# Patient Record
Sex: MALE | Race: WHITE | NOT HISPANIC OR LATINO | Employment: FULL TIME | URBAN - METROPOLITAN AREA
[De-identification: names, ages, dates, MRNs, and addresses within clinical notes are randomized per-mention and may not be internally consistent; named-entity substitution may affect disease eponyms.]

---

## 2020-01-15 NOTE — PROGRESS NOTES
"CRS Office Visit History and Physical    Referring MD:   Jt Fulton Md  [address]  [address]  [place] [address]    SUBJECTIVE:     Chief Complaint: Colostomy reversal    History of Present Illness:  The patient is new patient to this practice.   Course is as follows:  Patient is a 49 y.o. male presents for possible reversal of his colostomy.  Initially presented with 3rd episode of diverticulitis and 6x7cm abscess (IR drain placed 10/25/2019).  This drain was later removed, and then (per notes) patient required surgery.  Underwent ex-lap, sigmoid resection, colostomy (11/13/2019; Dr Fulton) for sigmoid diverticulitis.  Final pathology benign.  He states that his recovery from surgery was fairly straightforward.  No ICU stay or organ failure.  No significant wound issues.  Doing well with his colostomy.  He feels that he is back to his baseline after surgery. He is back at work.  Has never had a colonoscopy.  No other prior abdominal surgery. History of prior inguinal hernia repair in 2007.  No family history of colon or rectal cancer.  Denies any liquid of solid or liquid stool prior to his surgery.      Review of patient's allergies indicates:  No Known Allergies    History reviewed. No pertinent past medical history.  Past Surgical History:   Procedure Laterality Date    COLOSTOMY  11/13/2019    HERNIA REPAIR  2007     History reviewed. No pertinent family history.  Social History     Tobacco Use    Smoking status: Not on file   Substance Use Topics    Alcohol use: Not on file    Drug use: Not on file        Review of Systems:  ROS    OBJECTIVE:     Vital Signs (Most Recent)  /80 (BP Location: Left arm, Patient Position: Sitting, BP Method: Large (Automatic))   Pulse 83   Ht 5' 11.5" (1.816 m)   Wt 101.9 kg (224 lb 10.4 oz)   BMI 30.90 kg/m²     Physical Exam:  General: Unknown male in no distress   Neuro: Alert and oriented x 4.  Moves all extremities.     HEENT: No icterus.  " Trachea midline  Respiratory: Respirations are even and unlabored  Cardiac: Regular rate  Abdomen:  Soft, well-healed midline incision, colostomy in place in left lower quadrant, pink and healthy  Extremities: Warm dry and intact  Skin: No rashes      ASSESSMENT/PLAN:     49-year-old male status post Chelsea procedure on November 13th for sigmoid diverticulitis, currently doing well.    He presents for evaluation for colostomy reversal.  We discussed that this procedure may be performed laparoscopically or open, depending on the density of his adhesions.  I told him that the earliest I would consider reversal is 4 months after surgery. Will need colonoscopy and CT of the abdomen and pelvis prior to surgery. He would prefer to plan the procedure in early April because of his work schedule.  We did briefly discuss risks of surgery including bleeding, anastomotic leak, abscess, wound infection, damage to surrounding structures, need for temporary ileostomy, need to convert to open procedure. He will need to return to clinic in early or mid March for preoperative paperwork and labs.      Katie Spear MD  Staff Surgeon  Colon & Rectal Surgery

## 2020-01-20 ENCOUNTER — OFFICE VISIT (OUTPATIENT)
Dept: SURGERY | Facility: CLINIC | Age: 50
End: 2020-01-20
Attending: COLON & RECTAL SURGERY
Payer: COMMERCIAL

## 2020-01-20 ENCOUNTER — TELEPHONE (OUTPATIENT)
Dept: ENDOSCOPY | Facility: HOSPITAL | Age: 50
End: 2020-01-20

## 2020-01-20 VITALS
DIASTOLIC BLOOD PRESSURE: 80 MMHG | HEIGHT: 72 IN | BODY MASS INDEX: 30.42 KG/M2 | SYSTOLIC BLOOD PRESSURE: 131 MMHG | WEIGHT: 224.63 LBS | HEART RATE: 83 BPM

## 2020-01-20 DIAGNOSIS — Z12.11 SPECIAL SCREENING FOR MALIGNANT NEOPLASMS, COLON: Primary | ICD-10-CM

## 2020-01-20 DIAGNOSIS — K57.92 DIVERTICULITIS: Primary | ICD-10-CM

## 2020-01-20 DIAGNOSIS — D49.0 COLORECTAL NEOPLASM: ICD-10-CM

## 2020-01-20 DIAGNOSIS — Z93.3 COLOSTOMY IN PLACE: ICD-10-CM

## 2020-01-20 PROCEDURE — 3008F BODY MASS INDEX DOCD: CPT | Mod: CPTII,S$GLB,, | Performed by: COLON & RECTAL SURGERY

## 2020-01-20 PROCEDURE — 99203 PR OFFICE/OUTPT VISIT, NEW, LEVL III, 30-44 MIN: ICD-10-PCS | Mod: S$GLB,,, | Performed by: COLON & RECTAL SURGERY

## 2020-01-20 PROCEDURE — 99203 OFFICE O/P NEW LOW 30 MIN: CPT | Mod: S$GLB,,, | Performed by: COLON & RECTAL SURGERY

## 2020-01-20 PROCEDURE — 99999 PR PBB SHADOW E&M-NEW PATIENT-LVL IV: CPT | Mod: PBBFAC,,, | Performed by: COLON & RECTAL SURGERY

## 2020-01-20 PROCEDURE — 99999 PR PBB SHADOW E&M-NEW PATIENT-LVL IV: ICD-10-PCS | Mod: PBBFAC,,, | Performed by: COLON & RECTAL SURGERY

## 2020-01-20 PROCEDURE — 3008F PR BODY MASS INDEX (BMI) DOCUMENTED: ICD-10-PCS | Mod: CPTII,S$GLB,, | Performed by: COLON & RECTAL SURGERY

## 2020-01-20 RX ORDER — GABAPENTIN 400 MG/1
400 CAPSULE ORAL
COMMUNITY
Start: 2020-01-15 | End: 2022-10-11

## 2020-01-20 RX ORDER — POLYETHYLENE GLYCOL 3350, SODIUM SULFATE ANHYDROUS, SODIUM BICARBONATE, SODIUM CHLORIDE, POTASSIUM CHLORIDE 236; 22.74; 6.74; 5.86; 2.97 G/4L; G/4L; G/4L; G/4L; G/4L
4 POWDER, FOR SOLUTION ORAL ONCE
Qty: 4000 ML | Refills: 0 | Status: SHIPPED | OUTPATIENT
Start: 2020-01-20 | End: 2020-01-20

## 2020-01-20 NOTE — LETTER
January 20, 2020      Jt Fulton MD  [address]  [facility] [address]  [place] [address]           [facility]  [address]  [facility] [address]  Phone: [phone]          Patient: Moe Hinojosa   MR Number: [id number]   YOB: 1970   Date of Visit: 1/20/2020       Dear Dr. Jt Fulton:    Thank you for referring Moe Hinojosa to me for evaluation. Attached you will find relevant portions of my assessment and plan of care.    If you have questions, please do not hesitate to call me. I look forward to following Moe Hinojosa along with you.    Sincerely,    Katie Spear MD    Enclosure  CC:  No Recipients    If you would like to receive this communication electronically, please contact [email or web address] or [phone] to request more information on [facility] access.    For providers and/or their staff who would like to refer a patient to [facility], please contact us through our one-stop-shop provider referral line, [facility], at [phone].    If you feel you have received this communication in error or would no longer like to receive these types of communications, please e-mail [email or web address]

## 2020-01-22 ENCOUNTER — TELEPHONE (OUTPATIENT)
Dept: SURGERY | Facility: CLINIC | Age: 50
End: 2020-01-22

## 2020-01-22 NOTE — TELEPHONE ENCOUNTER
----- Message from Kiera Laguna sent at 1/22/2020  1:17 PM CST -----  Contact: Doretha hernandes/Dr Fulton's Office #[phone] fax#[phone]  Calling for progress notes to be sent to referring doctor Dr Fulton. Please send notes to fax to #[phone] [email or web address]

## 2020-03-05 ENCOUNTER — HOSPITAL ENCOUNTER (OUTPATIENT)
Facility: HOSPITAL | Age: 50
Discharge: HOME | End: 2020-03-05
Attending: COLON & RECTAL SURGERY | Admitting: COLON & RECTAL SURGERY
Payer: COMMERCIAL

## 2020-03-05 ENCOUNTER — ANESTHESIA (OUTPATIENT)
Dept: ENDOSCOPY | Facility: HOSPITAL | Age: 50
End: 2020-03-05
Payer: COMMERCIAL

## 2020-03-05 ENCOUNTER — ANESTHESIA EVENT (OUTPATIENT)
Dept: ENDOSCOPY | Facility: HOSPITAL | Age: 50
End: 2020-03-05
Payer: COMMERCIAL

## 2020-03-05 ENCOUNTER — HOSPITAL ENCOUNTER (OUTPATIENT)
Dept: RADIOLOGY | Facility: HOSPITAL | Age: 50
Discharge: HOME | End: 2020-03-05
Attending: COLON & RECTAL SURGERY | Admitting: COLON & RECTAL SURGERY
Payer: COMMERCIAL

## 2020-03-05 ENCOUNTER — TELEPHONE (OUTPATIENT)
Dept: SURGERY | Facility: CLINIC | Age: 50
End: 2020-03-05

## 2020-03-05 VITALS
OXYGEN SATURATION: 98 % | BODY MASS INDEX: 31.22 KG/M2 | TEMPERATURE: 98 F | HEART RATE: 81 BPM | SYSTOLIC BLOOD PRESSURE: 146 MMHG | HEIGHT: 71 IN | RESPIRATION RATE: 16 BRPM | WEIGHT: 223 LBS | DIASTOLIC BLOOD PRESSURE: 72 MMHG

## 2020-03-05 DIAGNOSIS — Z12.11 SCREENING FOR MALIGNANT NEOPLASM OF COLON: Primary | ICD-10-CM

## 2020-03-05 DIAGNOSIS — D49.0 COLORECTAL NEOPLASM: ICD-10-CM

## 2020-03-05 PROCEDURE — 88305 TISSUE EXAM BY PATHOLOGIST: CPT | Mod: 26,,, | Performed by: PATHOLOGY

## 2020-03-05 PROCEDURE — 45380 COLONOSCOPY AND BIOPSY: CPT | Mod: 33,,, | Performed by: COLON & RECTAL SURGERY

## 2020-03-05 PROCEDURE — 74177 CT ABDOMEN PELVIS WITH CONTRAST: ICD-10-PCS | Mod: 26,,, | Performed by: RADIOLOGY

## 2020-03-05 PROCEDURE — 88305 TISSUE EXAM BY PATHOLOGIST: ICD-10-PCS | Mod: 26,,, | Performed by: PATHOLOGY

## 2020-03-05 PROCEDURE — 45380 COLONOSCOPY AND BIOPSY: CPT | Performed by: COLON & RECTAL SURGERY

## 2020-03-05 PROCEDURE — 74177 CT ABD & PELVIS W/CONTRAST: CPT | Mod: 26,,, | Performed by: RADIOLOGY

## 2020-03-05 PROCEDURE — E9220 PRA ENDO ANESTHESIA: HCPCS | Mod: 33,,, | Performed by: NURSE ANESTHETIST, CERTIFIED REGISTERED

## 2020-03-05 PROCEDURE — 63600175 PHARM REV CODE 636 W HCPCS: Performed by: COLON & RECTAL SURGERY

## 2020-03-05 PROCEDURE — 63600175 PHARM REV CODE 636 W HCPCS: Performed by: NURSE ANESTHETIST, CERTIFIED REGISTERED

## 2020-03-05 PROCEDURE — 25500020 PHARM REV CODE 255: Performed by: COLON & RECTAL SURGERY

## 2020-03-05 PROCEDURE — 37000008 HC ANESTHESIA 1ST 15 MINUTES: Performed by: COLON & RECTAL SURGERY

## 2020-03-05 PROCEDURE — 88305 TISSUE EXAM BY PATHOLOGIST: CPT | Performed by: PATHOLOGY

## 2020-03-05 PROCEDURE — 37000009 HC ANESTHESIA EA ADD 15 MINS: Performed by: COLON & RECTAL SURGERY

## 2020-03-05 PROCEDURE — 27201012 HC FORCEPS, HOT/COLD, DISP: Performed by: COLON & RECTAL SURGERY

## 2020-03-05 PROCEDURE — 74177 CT ABD & PELVIS W/CONTRAST: CPT | Mod: TC

## 2020-03-05 PROCEDURE — 45380 PR COLONOSCOPY,BIOPSY: ICD-10-PCS | Mod: 33,,, | Performed by: COLON & RECTAL SURGERY

## 2020-03-05 PROCEDURE — E9220 PRA ENDO ANESTHESIA: ICD-10-PCS | Mod: 33,,, | Performed by: NURSE ANESTHETIST, CERTIFIED REGISTERED

## 2020-03-05 RX ORDER — PROPOFOL 10 MG/ML
VIAL (ML) INTRAVENOUS CONTINUOUS PRN
Status: DISCONTINUED | OUTPATIENT
Start: 2020-03-05 | End: 2020-03-05

## 2020-03-05 RX ORDER — SODIUM CHLORIDE 9 MG/ML
INJECTION, SOLUTION INTRAVENOUS CONTINUOUS
Status: DISCONTINUED | OUTPATIENT
Start: 2020-03-05 | End: 2020-03-05 | Stop reason: HOSPADM

## 2020-03-05 RX ORDER — PROPOFOL 10 MG/ML
VIAL (ML) INTRAVENOUS
Status: DISCONTINUED | OUTPATIENT
Start: 2020-03-05 | End: 2020-03-05

## 2020-03-05 RX ORDER — LIDOCAINE HYDROCHLORIDE 20 MG/ML
INJECTION INTRAVENOUS
Status: DISCONTINUED | OUTPATIENT
Start: 2020-03-05 | End: 2020-03-05

## 2020-03-05 RX ADMIN — LIDOCAINE HYDROCHLORIDE 40 MG: 20 INJECTION, SOLUTION INTRAVENOUS at 07:03

## 2020-03-05 RX ADMIN — SODIUM CHLORIDE: 0.9 INJECTION, SOLUTION INTRAVENOUS at 06:03

## 2020-03-05 RX ADMIN — IOHEXOL 15 ML: 350 INJECTION, SOLUTION INTRAVENOUS at 09:03

## 2020-03-05 RX ADMIN — PROPOFOL 150 MCG/KG/MIN: 10 INJECTION, EMULSION INTRAVENOUS at 07:03

## 2020-03-05 RX ADMIN — PROPOFOL 50 MG: 10 INJECTION, EMULSION INTRAVENOUS at 07:03

## 2020-03-05 RX ADMIN — IOHEXOL 100 ML: 350 INJECTION, SOLUTION INTRAVENOUS at 10:03

## 2020-03-05 NOTE — PLAN OF CARE
Pt verbalized understanding of d/c instructions. Wife at bedside. New ostomy bag placed. IV not removed for CT scan. Spoke with MD.

## 2020-03-05 NOTE — H&P
"COLONOSCOPY HISTORY AND PHYSICAL EXAM    Procedure : Colonoscopy      INDICATIONS: asymptomatic screening exam    Family Hx of CRC: None    Last Colonoscopy:  None  Findings: n/a       Past Medical History:   Diagnosis Date    Diverticulitis 2019     Sedation Problems: NO  History reviewed. No pertinent family history.  Fam Hx of Sedation Problems: NO  Social History     Socioeconomic History    Marital status:      Spouse name: Not on file    Number of children: Not on file    Years of education: Not on file    Highest education level: Not on file   Occupational History    Not on file   Social Needs    Financial resource strain: Not on file    Food insecurity:     Worry: Not on file     Inability: Not on file    Transportation needs:     Medical: Not on file     Non-medical: Not on file   Tobacco Use    Smoking status: Never Smoker    Smokeless tobacco: Never Used   Substance and Sexual Activity    Alcohol use: Not Currently    Drug use: Never    Sexual activity: Not on file   Lifestyle    Physical activity:     Days per week: Not on file     Minutes per session: Not on file    Stress: Not on file   Relationships    Social connections:     Talks on phone: Not on file     Gets together: Not on file     Attends Rest[employer] service: Not on file     Active member of club or organization: Not on file     Attends meetings of clubs or organizations: Not on file     Relationship status: Not on file   Other Topics Concern    Not on file   Social History Narrative    Not on file       Review of Systems - Negative except   Respiratory ROS: no dyspnea  Cardiovascular ROS: no exertional chest pain  Gastrointestinal ROS: NO abdominal discomfort,  NO rectal bleeding  Musculoskeletal ROS: no muscular pain  Neurological ROS: no recent stroke    Physical Exam:  BP (!) 142/80 (BP Location: Left arm, Patient Position: Lying)   Pulse 81   Temp 98.2 °F (36.8 °C) (Temporal)   Resp 16   Ht 5' 11" (1.803 m)   " Wt 101.2 kg (223 lb)   SpO2 95%   BMI 31.10 kg/m²   General: no distress  Head: normocephalic  Mallampati Score   Neck: supple, symmetrical, trachea midline  Lungs:  clear to auscultation bilaterally and normal respiratory effort  Heart: regular rate and rhythm and no murmur  Abdomen: soft, non-tender non-distented; bowel sounds normal; no masses,  no organomegaly  Extremities: no cyanosis or edema, or clubbing    ASA:  II    PLAN  COLONOSCOPY.    SedationPlan :MAC    The details of the procedure, the possible need for biopsy or polypectomy and the potential risks including bleeding, perforation, missed polyps were discussed in detail.

## 2020-03-05 NOTE — TRANSFER OF CARE
"Anesthesia Transfer of Care Note    Patient: Moe Hinojosa    Procedure(s) Performed: Procedure(s) (LRB):  COLONOSCOPY (N/A)    Patient location: GI    Anesthesia Type: general    Transport from OR: Transported from OR on room air with adequate spontaneous ventilation    Post pain: adequate analgesia    Post assessment: no apparent anesthetic complications and tolerated procedure well    Post vital signs: stable    Level of consciousness: awake    Nausea/Vomiting: no nausea/vomiting    Complications: none    Transfer of care protocol was followed      Last vitals:   Visit Vitals  /78 (BP Location: Left arm, Patient Position: Lying)   Pulse 85   Temp 36.5 °C (97.7 °F) (Temporal)   Resp 16   Ht 5' 11" (1.803 m)   Wt 101.2 kg (223 lb)   SpO2 95%   BMI 31.10 kg/m²     "

## 2020-03-05 NOTE — ANESTHESIA PREPROCEDURE EVALUATION
03/05/2020  Moe Hinojosa is a 49 y.o., male.    Patient Active Problem List   Diagnosis    Screening for malignant neoplasm of colon     Past Medical History:   Diagnosis Date    Diverticulitis 2019     Past Surgical History:   Procedure Laterality Date    COLON SURGERY  11/13/2019    resection    COLOSTOMY  11/13/2019    HERNIA REPAIR  2007         Anesthesia Evaluation    I have reviewed the Patient Summary Reports.     I have reviewed the Medications.     Review of Systems  Anesthesia Hx:   Denies Personal Hx of Anesthesia complications.       Physical Exam  General:  Well nourished    Airway/Jaw/Neck:  Airway Findings: Mouth Opening: Normal Tongue: Normal  General Airway Assessment: Adult  Mallampati: II  TM Distance: Normal, at least 6 cm      Dental:  Dental Findings: In tact   Chest/Lungs:  Chest/Lungs Findings: Clear to auscultation, Normal Respiratory Rate     Heart/Vascular:  Heart Findings: Rate: Normal  Rhythm: Regular Rhythm  Sounds: Normal        Mental Status:  Mental Status Findings:  Cooperative, Alert and Oriented         Anesthesia Plan  Type of Anesthesia, risks & benefits discussed:  Anesthesia Type:  general  Patient's Preference:   Intra-op Monitoring Plan: standard ASA monitors  Intra-op Monitoring Plan Comments:   Post Op Pain Control Plan: per primary service following discharge from PACU  Post Op Pain Control Plan Comments:   Induction:   IV  Beta Blocker:  Patient is not currently on a Beta-Blocker (No further documentation required).       Informed Consent: Patient understands risks and agrees with Anesthesia plan.  Questions answered. Anesthesia consent signed with patient.  ASA Score: 2     Day of Surgery Review of History & Physical: I have interviewed and examined the patient. I have reviewed the patient's H&P dated:  There are no significant changes.  H&P update  referred to the provider.         Ready For Surgery From Anesthesia Perspective.

## 2020-03-05 NOTE — DISCHARGE INSTRUCTIONS
Colonoscopy     A camera attached to a flexible tube with a viewing lens is used to take video pictures.     Colonoscopy is a test to view the inside of your lower digestive tract (colon and rectum). Sometimes it can show the last part of the small intestine (ileum). During the test, small pieces of tissue may be removed for testing. This is called a biopsy. Small growths, such as polyps, may also be removed.   Why is colonoscopy done?  The test is done to help look for colon cancer. And it can help find the source of abdominal pain, bleeding, and changes in bowel habits. It may be needed once a year, depending on factors such as your:  · Age  · Health history  · Family health history  · Symptoms  · Results from any prior colonoscopy  Risks and possible complications  These include:  · Bleeding               · A puncture or tear in the colon   · Risks of anesthesia  · A cancer lesion not being seen  Getting ready   To prepare for the test:  · Talk with your healthcare provider about the risks of the test (see below). Also ask your healthcare provider about alternatives to the test.  · Tell your healthcare provider about any medicines you take. Also tell him or her about any health conditions you may have.  · Make sure your rectum and colon are empty for the test. Follow the diet and bowel prep instructions exactly. If you dont, the test may need to be rescheduled.  · Plan for a friend or family member to drive you home after the test.     Colonoscopy provides an inside view of the entire colon.     You may discuss the results with your doctor right away or at a future visit.  During the test   The test is usually done in the hospital on an outpatient basis. This means you go home the same day. The procedure takes about 30 minutes. During that time:  · You are given relaxing (sedating) medicine through an IV line. You may be drowsy, or fully asleep.  · The healthcare provider will first give you a physical exam to  check for anal and rectal problems.  · Then the anus is lubricated and the scope inserted.  · If you are awake, you may have a feeling similar to needing to have a bowel movement. You may also feel pressure as air is pumped into the colon. Its OK to pass gas during the procedure.  · Biopsy, polyp removal, or other treatments may be done during the test.  After the test   You may have gas right after the test. It can help to try to pass it to help prevent later bloating. Your healthcare provider may discuss the results with you right away. Or you may need to schedule a follow-up visit to talk about the results. After the test, you can go back to your normal eating and other activities. You may be tired from the sedation and need to rest for a few hours.  Date Last Reviewed: 11/1/2016 © 2000-2017 [employer]. [address] [place] [address], [place] [address]. All rights reserved. This information is not intended as a substitute for professional medical care. Always follow your healthcare professional's instructions.

## 2020-03-05 NOTE — ANESTHESIA POSTPROCEDURE EVALUATION
Anesthesia Post Evaluation    Patient: Moe Hinojosa    Procedure(s) Performed: Procedure(s) (LRB):  COLONOSCOPY (N/A)    Final Anesthesia Type: general    Patient location during evaluation: GI PACU  Patient participation: Yes- Able to Participate  Level of consciousness: awake and alert  Post-procedure vital signs: reviewed and stable  Pain management: adequate  Airway patency: patent    PONV status at discharge: No PONV  Anesthetic complications: no      Cardiovascular status: blood pressure returned to baseline and stable  Respiratory status: unassisted, spontaneous ventilation and room air  Hydration status: euvolemic  Follow-up not needed.          Vitals Value Taken Time   /72 3/5/2020  8:05 AM   Temp 36.5 °C (97.7 °F) 3/5/2020  7:32 AM   Pulse 81 3/5/2020  8:05 AM   Resp 16 3/5/2020  8:05 AM   SpO2 98 % 3/5/2020  8:05 AM         No case tracking events are documented in the log.      Pain/Ana Score: Ana Score: 10 (3/5/2020  7:38 AM)

## 2020-03-05 NOTE — PROVATION PATIENT INSTRUCTIONS
Discharge Summary/Instructions after an Endoscopic Procedure  Patient Name: Moe Hinojosa  Patient MRN: [id number]  Patient YOB: 1970  Thursday, March 05, 2020  Katie Spear MD  RESTRICTIONS:  During your procedure today, you received medications for sedation.  These   medications may affect your judgment, balance and coordination.  Therefore,   for 24 hours, you have the following restrictions:   - DO NOT drive a car, operate machinery, make legal/financial decisions,   sign important papers or drink alcohol.    ACTIVITY:  Today: no heavy lifting, straining or running due to procedural   sedation/anesthesia.  The following day: return to full activity including work.  DIET:  Eat and drink normally unless instructed otherwise.     TREATMENT FOR COMMON SIDE EFFECTS:  - Mild abdominal pain, nausea, belching, bloating or excessive gas:  rest,   eat lightly and use a heating pad.  - Sore Throat: treat with throat lozenges and/or gargle with warm salt   water.  - Because air was used during the procedure, expelling large amounts of air   from your rectum or belching is normal.  - If a bowel prep was taken, you may not have a bowel movement for 1-3 days.    This is normal.  SYMPTOMS TO WATCH FOR AND REPORT TO YOUR PHYSICIAN:  1. Abdominal pain or bloating, other than gas cramps.  2. Chest pain.  3. Back pain.  4. Signs of infection such as: chills or fever occurring within 24 hours   after the procedure.  5. Rectal bleeding, which would show as bright red, maroon, or black stools.   (A tablespoon of blood from the rectum is not serious, especially if   hemorrhoids are present.)  6. Vomiting.  7. Weakness or dizziness.  GO DIRECTLY TO THE NEAREST EMERGENCY ROOM IF YOU HAVE ANY OF THE FOLLOWING:      Difficulty breathing              Chills and/or fever over 101 F   Persistent vomiting and/or vomiting blood   Severe abdominal pain   Severe chest pain   Black, tarry stools   Bleeding- more than one  tablespoon   Any other symptom or condition that you feel may need urgent attention  Your doctor recommends these additional instructions:  If any biopsies were taken, your doctors clinic will contact you in 1 to 2   weeks with any results.  - Discharge patient to home.   - Resume previous diet.   - Continue present medications.   - Await pathology results.   - Repeat colonoscopy date to be determined after pending pathology results   are reviewed for surveillance.   - Return to my office as previously scheduled.   - Written discharge instructions were provided to the patient.   - The signs and symptoms of potential delayed complications were discussed   with the patient.   - Patient has a contact number available for emergencies.   - Return to normal activities tomorrow.  For questions, problems or results please call your physician - Katie Spear MD at Work:  [phone].  [facility], EMERGENCY ROOM PHONE NUMBER: [phone]  IF A COMPLICATION OR EMERGENCY SITUATION ARISES AND YOU ARE UNABLE TO REACH   YOUR PHYSICIAN - GO DIRECTLY TO THE EMERGENCY ROOM.  Katie Spear MD  3/5/2020 7:29:59 AM  This report has been verified and signed electronically.  [employer]

## 2020-03-12 ENCOUNTER — TELEPHONE (OUTPATIENT)
Dept: ENDOSCOPY | Facility: HOSPITAL | Age: 50
End: 2020-03-12

## 2020-03-18 LAB — FINAL PATHOLOGIC DIAGNOSIS: NORMAL

## 2020-03-23 ENCOUNTER — TELEPHONE (OUTPATIENT)
Dept: SURGERY | Facility: CLINIC | Age: 50
End: 2020-03-23

## 2020-03-23 NOTE — TELEPHONE ENCOUNTER
----- Message from Paz Simon sent at 3/23/2020  2:39 PM CDT -----  Good Afternoon,    Patient is schedule for surgery on 4/2/20. Patient is requesting a call back regarding if he will still be able to complete surgery.    Please contact patient @ [phone].    Thank you,  Paz Burnett

## 2020-03-25 ENCOUNTER — TELEPHONE (OUTPATIENT)
Dept: SURGERY | Facility: CLINIC | Age: 50
End: 2020-03-25

## 2020-03-25 NOTE — TELEPHONE ENCOUNTER
----- Message from Katie Spear MD sent at 3/25/2020  2:49 PM CDT -----  Contact: pt   How many days has it been going on?  Does he have severe abdominal pain?    I would have him back off to just liquids for 1-2 days, try a dose of Milk of Magnesia.  If not better in 1-2 days he prob needs to go to the ED.    ----- Message -----  From: Paget Bazile, MA  Sent: 3/25/2020  11:50 AM CDT  To: Katie Spear MD    Pt states he is not running fever and no one else in the house is sick.  He cant remember what he has for nausea but it doesn't work and he throws it up.  Please advise.  ----- Message -----  From: Mercy Duque  Sent: 3/25/2020  10:06 AM CDT  To: Rainer Wilson Staff    Experiencing Constipation, vomiting and nauseous. Please call

## 2020-03-25 NOTE — TELEPHONE ENCOUNTER
Called pt and he is complaining of stomach being tight and vomiting.  He states he has no fever.  Pt said he has an oral anti nausea but continues to throw it up.  He can not keep anything down.  States that his wife is not sick.  Will send to Dr. Spear

## 2020-05-01 ENCOUNTER — TELEPHONE (OUTPATIENT)
Dept: SURGERY | Facility: CLINIC | Age: 50
End: 2020-05-01

## 2020-05-01 NOTE — TELEPHONE ENCOUNTER
----- Message from Domi Mcclellan sent at 5/1/2020  1:26 PM CDT -----  Contact: self  Type:  Patient Returning Call    Who Called:  Patient returning call to nurse to reschedule surgery  Who Left Message for Patient:  Does the patient know what this is regarding?  Would the patient rather a call back or a response via [facility]?call  Best Call Back Number:[phone]  Additional Information:

## 2020-05-01 NOTE — TELEPHONE ENCOUNTER
Pt received a text to call and schedule any test or surgery's that were scheduled before Covid-19. Told him I will get with Dr Spear and call him back about his surgery.

## 2020-06-02 DIAGNOSIS — K57.92 DIVERTICULITIS: Primary | ICD-10-CM

## 2020-06-04 DIAGNOSIS — Z01.818 PRE-OP TESTING: ICD-10-CM

## 2020-06-10 ENCOUNTER — OFFICE VISIT (OUTPATIENT)
Dept: SURGERY | Facility: OTHER | Age: 50
End: 2020-06-10
Attending: COLON & RECTAL SURGERY
Payer: COMMERCIAL

## 2020-06-10 VITALS
DIASTOLIC BLOOD PRESSURE: 68 MMHG | SYSTOLIC BLOOD PRESSURE: 112 MMHG | BODY MASS INDEX: 34.01 KG/M2 | WEIGHT: 242.94 LBS | HEART RATE: 72 BPM | HEIGHT: 71 IN

## 2020-06-10 DIAGNOSIS — Z93.3 COLOSTOMY IN PLACE: Primary | ICD-10-CM

## 2020-06-10 PROCEDURE — 3008F BODY MASS INDEX DOCD: CPT | Mod: CPTII,S$GLB,, | Performed by: COLON & RECTAL SURGERY

## 2020-06-10 PROCEDURE — 99999 PR PBB SHADOW E&M-EST. PATIENT-LVL III: CPT | Mod: PBBFAC,,, | Performed by: COLON & RECTAL SURGERY

## 2020-06-10 PROCEDURE — 3008F PR BODY MASS INDEX (BMI) DOCUMENTED: ICD-10-PCS | Mod: CPTII,S$GLB,, | Performed by: COLON & RECTAL SURGERY

## 2020-06-10 PROCEDURE — 99999 PR PBB SHADOW E&M-EST. PATIENT-LVL III: ICD-10-PCS | Mod: PBBFAC,,, | Performed by: COLON & RECTAL SURGERY

## 2020-06-10 PROCEDURE — 99213 OFFICE O/P EST LOW 20 MIN: CPT | Mod: S$GLB,,, | Performed by: COLON & RECTAL SURGERY

## 2020-06-10 PROCEDURE — 99213 PR OFFICE/OUTPT VISIT, EST, LEVL III, 20-29 MIN: ICD-10-PCS | Mod: S$GLB,,, | Performed by: COLON & RECTAL SURGERY

## 2020-06-10 RX ORDER — POLYETHYLENE GLYCOL 3350 17 G/17G
POWDER, FOR SOLUTION ORAL
Qty: 238 G | Refills: 0 | Status: ON HOLD | OUTPATIENT
Start: 2020-06-10 | End: 2020-06-25 | Stop reason: HOSPADM

## 2020-06-10 RX ORDER — NEOMYCIN SULFATE 500 MG/1
500 TABLET ORAL SEE ADMIN INSTRUCTIONS
Qty: 6 TABLET | Refills: 0 | Status: ON HOLD | OUTPATIENT
Start: 2020-06-10 | End: 2020-06-25 | Stop reason: HOSPADM

## 2020-06-10 RX ORDER — METRONIDAZOLE 500 MG/1
500 TABLET ORAL SEE ADMIN INSTRUCTIONS
Qty: 3 TABLET | Refills: 0 | Status: ON HOLD | OUTPATIENT
Start: 2020-06-10 | End: 2020-06-25 | Stop reason: HOSPADM

## 2020-06-10 NOTE — H&P (VIEW-ONLY)
"CRS Office Visit History and Physical      SUBJECTIVE:     Chief Complaint: Colostomy reversal    History of Present Illness:  Patient is a 50 y.o. male presents for possible reversal of his colostomy.  Initially presented with 3rd episode of diverticulitis and 6x7cm abscess (IR drain placed 10/25/2019).  This drain was later removed, and then (per notes) patient required surgery.  Underwent ex-lap, sigmoid resection, colostomy (11/13/2019; Dr Fulton) for sigmoid diverticulitis.  Final pathology benign.  He states that his recovery from surgery was fairly straightforward.  No ICU stay or organ failure.  No significant wound issues.  Doing well with his colostomy.  He feels that he is back to his baseline after surgery. He is back at work.  No other prior abdominal surgery. History of prior inguinal hernia repair in 2007.  No family history of colon or rectal cancer.  Denies any liquid of solid or liquid stool prior to his surgery.  Colonoscopy with me in March showed small adenomatous polyp of the transverse colon.  CT scan March 5th with no residual inflammation or abscess.      Review of patient's allergies indicates:  No Known Allergies    Past Medical History:   Diagnosis Date    Diverticulitis 2019     Past Surgical History:   Procedure Laterality Date    COLON SURGERY  11/13/2019    resection    COLONOSCOPY N/A 3/5/2020    Procedure: COLONOSCOPY;  Surgeon: Katie Spear MD;  Location: [facility] ([address] [place] [address]);  Service: Endoscopy;  Laterality: N/A;    COLOSTOMY  11/13/2019    HERNIA REPAIR  2007     History reviewed. No pertinent family history.  Social History     Tobacco Use    Smoking status: Never Smoker    Smokeless tobacco: Never Used   Substance Use Topics    Alcohol use: Not Currently    Drug use: Never        Review of Systems:  ROS    OBJECTIVE:     Vital Signs (Most Recent)  /68 (BP Location: Left arm, Patient Position: Sitting, BP Method: Large (Manual))   Pulse 72   Ht 5' 11" " (1.803 m)   Wt 110.2 kg (242 lb 15.2 oz)   BMI 33.88 kg/m²     Physical Exam:  General: Unknown male in no distress   Neuro: Alert and oriented x 4.  Moves all extremities.     HEENT: No icterus.  Trachea midline  Respiratory: Respirations are even and unlabored  Cardiac: Regular rate  Abdomen:  Soft, well-healed midline incision, colostomy in place in left lower quadrant, pink and healthy  Extremities: Warm dry and intact  Skin: No rashes    CT abd/pel (3/5/2020)  Postoperative change status post sigmoid resection with colostomy in place.  No evidence of residual abscess.    I have personally reviewed his CT images.      ASSESSMENT/PLAN:     49-year-old male status post Chelsea procedure on November 13th for sigmoid diverticulitis, ready for ostomy reversal.    I have recommended a laparoscopic, possible open, colostomy reversal.  We discussed risks of surgery including bleeding, anastomotic leak, surgical site infection, damage to ureter or bowel, need for reoperation, hernia, blood clot.  We also discussed anticipated postoperative recovery.  We reviewed the procedure using visual diagrams.  Consents were signed today in clinic.  Asked if any additional questions, none at this time.      Katie Spear MD  Staff Surgeon  Colon & Rectal Surgery

## 2020-06-10 NOTE — PROGRESS NOTES
"CRS Office Visit History and Physical      SUBJECTIVE:     Chief Complaint: Colostomy reversal    History of Present Illness:  Patient is a 50 y.o. male presents for possible reversal of his colostomy.  Initially presented with 3rd episode of diverticulitis and 6x7cm abscess (IR drain placed 10/25/2019).  This drain was later removed, and then (per notes) patient required surgery.  Underwent ex-lap, sigmoid resection, colostomy (11/13/2019; Dr Fulton) for sigmoid diverticulitis.  Final pathology benign.  He states that his recovery from surgery was fairly straightforward.  No ICU stay or organ failure.  No significant wound issues.  Doing well with his colostomy.  He feels that he is back to his baseline after surgery. He is back at work.  No other prior abdominal surgery. History of prior inguinal hernia repair in 2007.  No family history of colon or rectal cancer.  Denies any liquid of solid or liquid stool prior to his surgery.  Colonoscopy with me in March showed small adenomatous polyp of the transverse colon.  CT scan March 5th with no residual inflammation or abscess.      Review of patient's allergies indicates:  No Known Allergies    Past Medical History:   Diagnosis Date    Diverticulitis 2019     Past Surgical History:   Procedure Laterality Date    COLON SURGERY  11/13/2019    resection    COLONOSCOPY N/A 3/5/2020    Procedure: COLONOSCOPY;  Surgeon: Katie Spear MD;  Location: [facility] ([address] [place] [address]);  Service: Endoscopy;  Laterality: N/A;    COLOSTOMY  11/13/2019    HERNIA REPAIR  2007     History reviewed. No pertinent family history.  Social History     Tobacco Use    Smoking status: Never Smoker    Smokeless tobacco: Never Used   Substance Use Topics    Alcohol use: Not Currently    Drug use: Never        Review of Systems:  ROS    OBJECTIVE:     Vital Signs (Most Recent)  /68 (BP Location: Left arm, Patient Position: Sitting, BP Method: Large (Manual))   Pulse 72   Ht 5' 11" " (1.803 m)   Wt 110.2 kg (242 lb 15.2 oz)   BMI 33.88 kg/m²     Physical Exam:  General: Unknown male in no distress   Neuro: Alert and oriented x 4.  Moves all extremities.     HEENT: No icterus.  Trachea midline  Respiratory: Respirations are even and unlabored  Cardiac: Regular rate  Abdomen:  Soft, well-healed midline incision, colostomy in place in left lower quadrant, pink and healthy  Extremities: Warm dry and intact  Skin: No rashes    CT abd/pel (3/5/2020)  Postoperative change status post sigmoid resection with colostomy in place.  No evidence of residual abscess.    I have personally reviewed his CT images.      ASSESSMENT/PLAN:     49-year-old male status post Chelsea procedure on November 13th for sigmoid diverticulitis, ready for ostomy reversal.    I have recommended a laparoscopic, possible open, colostomy reversal.  We discussed risks of surgery including bleeding, anastomotic leak, surgical site infection, damage to ureter or bowel, need for reoperation, hernia, blood clot.  We also discussed anticipated postoperative recovery.  We reviewed the procedure using visual diagrams.  Consents were signed today in clinic.  Asked if any additional questions, none at this time.      Katie Spear MD  Staff Surgeon  Colon & Rectal Surgery

## 2020-06-11 ENCOUNTER — TELEPHONE (OUTPATIENT)
Dept: SURGERY | Facility: CLINIC | Age: 50
End: 2020-06-11

## 2020-06-11 NOTE — TELEPHONE ENCOUNTER
Spoke with [facility] and informed them that the patient lives greater than 50 miles out.  Patient to have Covid testing the morning of surgery and will need to be there 2 1/2 early.

## 2020-06-22 ENCOUNTER — TELEPHONE (OUTPATIENT)
Dept: SURGERY | Facility: CLINIC | Age: 50
End: 2020-06-22

## 2020-06-23 ENCOUNTER — ANESTHESIA (OUTPATIENT)
Dept: SURGERY | Facility: HOSPITAL | Age: 50
DRG: 330 | End: 2020-06-23
Payer: COMMERCIAL

## 2020-06-23 ENCOUNTER — HOSPITAL ENCOUNTER (INPATIENT)
Facility: HOSPITAL | Age: 50
LOS: 2 days | Discharge: HOME | DRG: 330 | End: 2020-06-25
Attending: COLON & RECTAL SURGERY | Admitting: COLON & RECTAL SURGERY
Payer: COMMERCIAL

## 2020-06-23 ENCOUNTER — ANESTHESIA EVENT (OUTPATIENT)
Dept: SURGERY | Facility: HOSPITAL | Age: 50
DRG: 330 | End: 2020-06-23
Payer: COMMERCIAL

## 2020-06-23 DIAGNOSIS — Z93.3 COLOSTOMY IN PLACE: Primary | ICD-10-CM

## 2020-06-23 LAB
ABO + RH BLD: NORMAL
BLD GP AB SCN CELLS X3 SERPL QL: NORMAL
SARS-COV-2 RDRP RESP QL NAA+PROBE: NEGATIVE

## 2020-06-23 PROCEDURE — 63600175 PHARM REV CODE 636 W HCPCS: Performed by: NURSE PRACTITIONER

## 2020-06-23 PROCEDURE — 20600001 HC STEP DOWN PRIVATE ROOM

## 2020-06-23 PROCEDURE — 86901 BLOOD TYPING SEROLOGIC RH(D): CPT

## 2020-06-23 PROCEDURE — 88304 TISSUE EXAM BY PATHOLOGIST: CPT | Mod: 26,,, | Performed by: PATHOLOGY

## 2020-06-23 PROCEDURE — 25000003 PHARM REV CODE 250: Performed by: NURSE ANESTHETIST, CERTIFIED REGISTERED

## 2020-06-23 PROCEDURE — 63600175 PHARM REV CODE 636 W HCPCS: Performed by: NURSE ANESTHETIST, CERTIFIED REGISTERED

## 2020-06-23 PROCEDURE — D9220A PRA ANESTHESIA: ICD-10-PCS | Mod: ANES,,, | Performed by: ANESTHESIOLOGY

## 2020-06-23 PROCEDURE — S0030 INJECTION, METRONIDAZOLE: HCPCS | Performed by: NURSE PRACTITIONER

## 2020-06-23 PROCEDURE — 88307 PR  SURG PATH,LEVEL V: ICD-10-PCS | Mod: 26,,, | Performed by: PATHOLOGY

## 2020-06-23 PROCEDURE — 44227 PR LAP, SURG CLOSE ENTEROSTOMY RESECT ANAST: ICD-10-PCS | Mod: ,,, | Performed by: COLON & RECTAL SURGERY

## 2020-06-23 PROCEDURE — 71000015 HC POSTOP RECOV 1ST HR: Performed by: COLON & RECTAL SURGERY

## 2020-06-23 PROCEDURE — 94761 N-INVAS EAR/PLS OXIMETRY MLT: CPT

## 2020-06-23 PROCEDURE — 88307 TISSUE EXAM BY PATHOLOGIST: CPT | Mod: 26,,, | Performed by: PATHOLOGY

## 2020-06-23 PROCEDURE — D9220A PRA ANESTHESIA: Mod: ANES,,, | Performed by: ANESTHESIOLOGY

## 2020-06-23 PROCEDURE — 63600175 PHARM REV CODE 636 W HCPCS: Performed by: STUDENT IN AN ORGANIZED HEALTH CARE EDUCATION/TRAINING PROGRAM

## 2020-06-23 PROCEDURE — 37000008 HC ANESTHESIA 1ST 15 MINUTES: Performed by: COLON & RECTAL SURGERY

## 2020-06-23 PROCEDURE — 88307 TISSUE EXAM BY PATHOLOGIST: CPT | Performed by: PATHOLOGY

## 2020-06-23 PROCEDURE — 37000009 HC ANESTHESIA EA ADD 15 MINS: Performed by: COLON & RECTAL SURGERY

## 2020-06-23 PROCEDURE — 88304 PR  SURG PATH,LEVEL III: ICD-10-PCS | Mod: 26,,, | Performed by: PATHOLOGY

## 2020-06-23 PROCEDURE — 44227 LAP CLOSE ENTEROSTOMY: CPT | Mod: ,,, | Performed by: COLON & RECTAL SURGERY

## 2020-06-23 PROCEDURE — 36000710: Performed by: COLON & RECTAL SURGERY

## 2020-06-23 PROCEDURE — D9220A PRA ANESTHESIA: Mod: CRNA,,, | Performed by: NURSE ANESTHETIST, CERTIFIED REGISTERED

## 2020-06-23 PROCEDURE — 71000033 HC RECOVERY, INTIAL HOUR: Performed by: COLON & RECTAL SURGERY

## 2020-06-23 PROCEDURE — 27201423 OPTIME MED/SURG SUP & DEVICES STERILE SUPPLY: Performed by: COLON & RECTAL SURGERY

## 2020-06-23 PROCEDURE — C9290 INJ, BUPIVACAINE LIPOSOME: HCPCS | Performed by: COLON & RECTAL SURGERY

## 2020-06-23 PROCEDURE — U0002 COVID-19 LAB TEST NON-CDC: HCPCS

## 2020-06-23 PROCEDURE — 25000003 PHARM REV CODE 250: Performed by: STUDENT IN AN ORGANIZED HEALTH CARE EDUCATION/TRAINING PROGRAM

## 2020-06-23 PROCEDURE — 36000711: Performed by: COLON & RECTAL SURGERY

## 2020-06-23 PROCEDURE — 94799 UNLISTED PULMONARY SVC/PX: CPT

## 2020-06-23 PROCEDURE — 63600175 PHARM REV CODE 636 W HCPCS: Performed by: COLON & RECTAL SURGERY

## 2020-06-23 PROCEDURE — 71000039 HC RECOVERY, EACH ADD'L HOUR: Performed by: COLON & RECTAL SURGERY

## 2020-06-23 PROCEDURE — D9220A PRA ANESTHESIA: ICD-10-PCS | Mod: CRNA,,, | Performed by: NURSE ANESTHETIST, CERTIFIED REGISTERED

## 2020-06-23 PROCEDURE — 88304 TISSUE EXAM BY PATHOLOGIST: CPT | Performed by: PATHOLOGY

## 2020-06-23 PROCEDURE — 25000003 PHARM REV CODE 250: Performed by: NURSE PRACTITIONER

## 2020-06-23 RX ORDER — LIDOCAINE HYDROCHLORIDE 10 MG/ML
1 INJECTION, SOLUTION EPIDURAL; INFILTRATION; INTRACAUDAL; PERINEURAL
Status: COMPLETED | OUTPATIENT
Start: 2020-06-23 | End: 2020-06-23

## 2020-06-23 RX ORDER — LIDOCAINE HYDROCHLORIDE ANHYDROUS AND DEXTROSE MONOHYDRATE .8; 5 G/100ML; G/100ML
INJECTION, SOLUTION INTRAVENOUS CONTINUOUS PRN
Status: DISCONTINUED | OUTPATIENT
Start: 2020-06-23 | End: 2020-06-23

## 2020-06-23 RX ORDER — ONDANSETRON 2 MG/ML
INJECTION INTRAMUSCULAR; INTRAVENOUS
Status: DISCONTINUED | OUTPATIENT
Start: 2020-06-23 | End: 2020-06-23

## 2020-06-23 RX ORDER — METRONIDAZOLE 500 MG/100ML
500 INJECTION, SOLUTION INTRAVENOUS
Status: COMPLETED | OUTPATIENT
Start: 2020-06-23 | End: 2020-06-23

## 2020-06-23 RX ORDER — ONDANSETRON 2 MG/ML
4 INJECTION INTRAMUSCULAR; INTRAVENOUS EVERY 12 HOURS PRN
Status: DISCONTINUED | OUTPATIENT
Start: 2020-06-23 | End: 2020-06-25 | Stop reason: HOSPADM

## 2020-06-23 RX ORDER — ONDANSETRON 2 MG/ML
4 INJECTION INTRAMUSCULAR; INTRAVENOUS ONCE AS NEEDED
Status: DISCONTINUED | OUTPATIENT
Start: 2020-06-23 | End: 2020-06-23

## 2020-06-23 RX ORDER — DEXAMETHASONE SODIUM PHOSPHATE 4 MG/ML
INJECTION, SOLUTION INTRA-ARTICULAR; INTRALESIONAL; INTRAMUSCULAR; INTRAVENOUS; SOFT TISSUE
Status: DISCONTINUED | OUTPATIENT
Start: 2020-06-23 | End: 2020-06-23

## 2020-06-23 RX ORDER — ACETAMINOPHEN 10 MG/ML
1000 INJECTION, SOLUTION INTRAVENOUS EVERY 8 HOURS
Status: COMPLETED | OUTPATIENT
Start: 2020-06-23 | End: 2020-06-24

## 2020-06-23 RX ORDER — GABAPENTIN 300 MG/1
300 CAPSULE ORAL
Status: COMPLETED | OUTPATIENT
Start: 2020-06-23 | End: 2020-06-23

## 2020-06-23 RX ORDER — MIDAZOLAM HYDROCHLORIDE 1 MG/ML
INJECTION, SOLUTION INTRAMUSCULAR; INTRAVENOUS
Status: DISCONTINUED | OUTPATIENT
Start: 2020-06-23 | End: 2020-06-23

## 2020-06-23 RX ORDER — SODIUM CHLORIDE 9 MG/ML
INJECTION, SOLUTION INTRAVENOUS
Status: COMPLETED | OUTPATIENT
Start: 2020-06-23 | End: 2020-06-23

## 2020-06-23 RX ORDER — LABETALOL HYDROCHLORIDE 5 MG/ML
INJECTION, SOLUTION INTRAVENOUS
Status: DISCONTINUED | OUTPATIENT
Start: 2020-06-23 | End: 2020-06-23

## 2020-06-23 RX ORDER — IBUPROFEN 400 MG/1
800 TABLET ORAL EVERY 8 HOURS
Status: DISCONTINUED | OUTPATIENT
Start: 2020-06-24 | End: 2020-06-25 | Stop reason: HOSPADM

## 2020-06-23 RX ORDER — LIDOCAINE HYDROCHLORIDE 20 MG/ML
INJECTION INTRAVENOUS
Status: DISCONTINUED | OUTPATIENT
Start: 2020-06-23 | End: 2020-06-23

## 2020-06-23 RX ORDER — PROPOFOL 10 MG/ML
VIAL (ML) INTRAVENOUS
Status: DISCONTINUED | OUTPATIENT
Start: 2020-06-23 | End: 2020-06-23

## 2020-06-23 RX ORDER — ACETAMINOPHEN 650 MG/20.3ML
975 LIQUID ORAL
Status: COMPLETED | OUTPATIENT
Start: 2020-06-23 | End: 2020-06-23

## 2020-06-23 RX ORDER — ALVIMOPAN 12 MG/1
12 CAPSULE ORAL ONCE
Status: COMPLETED | OUTPATIENT
Start: 2020-06-23 | End: 2020-06-23

## 2020-06-23 RX ORDER — GABAPENTIN 300 MG/1
300 CAPSULE ORAL 3 TIMES DAILY
Status: DISCONTINUED | OUTPATIENT
Start: 2020-06-23 | End: 2020-06-23

## 2020-06-23 RX ORDER — MUPIROCIN 20 MG/G
OINTMENT TOPICAL 2 TIMES DAILY
Status: DISCONTINUED | OUTPATIENT
Start: 2020-06-23 | End: 2020-06-25 | Stop reason: HOSPADM

## 2020-06-23 RX ORDER — ALVIMOPAN 12 MG/1
12 CAPSULE ORAL 2 TIMES DAILY
Status: DISCONTINUED | OUTPATIENT
Start: 2020-06-23 | End: 2020-06-25 | Stop reason: HOSPADM

## 2020-06-23 RX ORDER — ROCURONIUM BROMIDE 10 MG/ML
INJECTION, SOLUTION INTRAVENOUS
Status: DISCONTINUED | OUTPATIENT
Start: 2020-06-23 | End: 2020-06-23

## 2020-06-23 RX ORDER — SODIUM CHLORIDE 0.9 % (FLUSH) 0.9 %
10 SYRINGE (ML) INJECTION
Status: DISCONTINUED | OUTPATIENT
Start: 2020-06-23 | End: 2020-06-25 | Stop reason: HOSPADM

## 2020-06-23 RX ORDER — OXYCODONE HYDROCHLORIDE 10 MG/1
10 TABLET ORAL EVERY 4 HOURS PRN
Status: DISCONTINUED | OUTPATIENT
Start: 2020-06-23 | End: 2020-06-25 | Stop reason: HOSPADM

## 2020-06-23 RX ORDER — ENOXAPARIN SODIUM 100 MG/ML
40 INJECTION SUBCUTANEOUS EVERY 24 HOURS
Status: DISCONTINUED | OUTPATIENT
Start: 2020-06-24 | End: 2020-06-25 | Stop reason: HOSPADM

## 2020-06-23 RX ORDER — MUPIROCIN 20 MG/G
1 OINTMENT TOPICAL
Status: COMPLETED | OUTPATIENT
Start: 2020-06-23 | End: 2020-06-23

## 2020-06-23 RX ORDER — ACETAMINOPHEN 500 MG
1000 TABLET ORAL EVERY 8 HOURS
Status: DISCONTINUED | OUTPATIENT
Start: 2020-06-24 | End: 2020-06-25 | Stop reason: HOSPADM

## 2020-06-23 RX ORDER — TRAMADOL HYDROCHLORIDE 50 MG/1
50 TABLET ORAL EVERY 6 HOURS PRN
Status: DISCONTINUED | OUTPATIENT
Start: 2020-06-23 | End: 2020-06-25 | Stop reason: HOSPADM

## 2020-06-23 RX ORDER — HEPARIN SODIUM 5000 [USP'U]/ML
5000 INJECTION, SOLUTION INTRAVENOUS; SUBCUTANEOUS EVERY 8 HOURS
Status: COMPLETED | OUTPATIENT
Start: 2020-06-23 | End: 2020-06-23

## 2020-06-23 RX ORDER — KETAMINE HCL IN 0.9 % NACL 50 MG/5 ML
SYRINGE (ML) INTRAVENOUS
Status: DISCONTINUED | OUTPATIENT
Start: 2020-06-23 | End: 2020-06-23

## 2020-06-23 RX ORDER — FENTANYL CITRATE 50 UG/ML
INJECTION, SOLUTION INTRAMUSCULAR; INTRAVENOUS
Status: DISCONTINUED | OUTPATIENT
Start: 2020-06-23 | End: 2020-06-23

## 2020-06-23 RX ORDER — FENTANYL CITRATE 50 UG/ML
25 INJECTION, SOLUTION INTRAMUSCULAR; INTRAVENOUS EVERY 5 MIN PRN
Status: DISCONTINUED | OUTPATIENT
Start: 2020-06-23 | End: 2020-06-23

## 2020-06-23 RX ORDER — TRIPROLIDINE/PSEUDOEPHEDRINE 2.5MG-60MG
600 TABLET ORAL
Status: COMPLETED | OUTPATIENT
Start: 2020-06-23 | End: 2020-06-23

## 2020-06-23 RX ORDER — SODIUM CHLORIDE 0.9 % (FLUSH) 0.9 %
10 SYRINGE (ML) INJECTION
Status: DISCONTINUED | OUTPATIENT
Start: 2020-06-23 | End: 2020-06-23

## 2020-06-23 RX ORDER — OXYCODONE HYDROCHLORIDE 5 MG/1
5 TABLET ORAL EVERY 4 HOURS PRN
Status: DISCONTINUED | OUTPATIENT
Start: 2020-06-23 | End: 2020-06-25 | Stop reason: HOSPADM

## 2020-06-23 RX ORDER — SODIUM CHLORIDE 9 MG/ML
INJECTION, SOLUTION INTRAVENOUS CONTINUOUS
Status: DISCONTINUED | OUTPATIENT
Start: 2020-06-23 | End: 2020-06-23

## 2020-06-23 RX ORDER — GABAPENTIN 400 MG/1
400 CAPSULE ORAL 3 TIMES DAILY
Status: DISCONTINUED | OUTPATIENT
Start: 2020-06-23 | End: 2020-06-25 | Stop reason: HOSPADM

## 2020-06-23 RX ADMIN — SODIUM CHLORIDE: 0.9 INJECTION, SOLUTION INTRAVENOUS at 11:06

## 2020-06-23 RX ADMIN — GABAPENTIN 300 MG: 300 CAPSULE ORAL at 11:06

## 2020-06-23 RX ADMIN — OXYCODONE HYDROCHLORIDE 10 MG: 10 TABLET ORAL at 08:06

## 2020-06-23 RX ADMIN — IBUPROFEN 800 MG: 800 INJECTION INTRAVENOUS at 10:06

## 2020-06-23 RX ADMIN — ROCURONIUM BROMIDE 20 MG: 10 INJECTION, SOLUTION INTRAVENOUS at 02:06

## 2020-06-23 RX ADMIN — ROCURONIUM BROMIDE 10 MG: 10 INJECTION, SOLUTION INTRAVENOUS at 03:06

## 2020-06-23 RX ADMIN — LABETALOL HYDROCHLORIDE 5 MG: 5 INJECTION, SOLUTION INTRAVENOUS at 02:06

## 2020-06-23 RX ADMIN — LIDOCAINE HYDROCHLORIDE 10 MG: 10 INJECTION, SOLUTION EPIDURAL; INFILTRATION; INTRACAUDAL; PERINEURAL at 11:06

## 2020-06-23 RX ADMIN — SODIUM CHLORIDE, SODIUM GLUCONATE, SODIUM ACETATE, POTASSIUM CHLORIDE, MAGNESIUM CHLORIDE, SODIUM PHOSPHATE, DIBASIC, AND POTASSIUM PHOSPHATE: .53; .5; .37; .037; .03; .012; .00082 INJECTION, SOLUTION INTRAVENOUS at 02:06

## 2020-06-23 RX ADMIN — MUPIROCIN 1 G: 20 OINTMENT TOPICAL at 11:06

## 2020-06-23 RX ADMIN — MUPIROCIN: 20 OINTMENT TOPICAL at 08:06

## 2020-06-23 RX ADMIN — FENTANYL CITRATE 100 MCG: 50 INJECTION, SOLUTION INTRAMUSCULAR; INTRAVENOUS at 01:06

## 2020-06-23 RX ADMIN — PROPOFOL 170 MG: 10 INJECTION, EMULSION INTRAVENOUS at 01:06

## 2020-06-23 RX ADMIN — ONDANSETRON 4 MG: 2 INJECTION INTRAMUSCULAR; INTRAVENOUS at 08:06

## 2020-06-23 RX ADMIN — CEFTRIAXONE 2 G: 2 INJECTION, SOLUTION INTRAVENOUS at 01:06

## 2020-06-23 RX ADMIN — IBUPROFEN 600 MG: 100 SUSPENSION ORAL at 11:06

## 2020-06-23 RX ADMIN — DEXAMETHASONE SODIUM PHOSPHATE 8 MG: 4 INJECTION, SOLUTION INTRAMUSCULAR; INTRAVENOUS at 01:06

## 2020-06-23 RX ADMIN — ALVIMOPAN 12 MG: 12 CAPSULE ORAL at 11:06

## 2020-06-23 RX ADMIN — ACETAMINOPHEN 976.6 MG: 160 SOLUTION ORAL at 11:06

## 2020-06-23 RX ADMIN — GABAPENTIN 400 MG: 400 CAPSULE ORAL at 08:06

## 2020-06-23 RX ADMIN — Medication 10 MG: at 04:06

## 2020-06-23 RX ADMIN — ROCURONIUM BROMIDE 30 MG: 10 INJECTION, SOLUTION INTRAVENOUS at 01:06

## 2020-06-23 RX ADMIN — Medication 10 MG: at 02:06

## 2020-06-23 RX ADMIN — METRONIDAZOLE 500 MG: 500 SOLUTION INTRAVENOUS at 01:06

## 2020-06-23 RX ADMIN — Medication 10 MG: at 03:06

## 2020-06-23 RX ADMIN — Medication 20 MG: at 01:06

## 2020-06-23 RX ADMIN — ALVIMOPAN 12 MG: 12 CAPSULE ORAL at 08:06

## 2020-06-23 RX ADMIN — ACETAMINOPHEN 1000 MG: 10 INJECTION, SOLUTION INTRAVENOUS at 09:06

## 2020-06-23 RX ADMIN — ONDANSETRON 4 MG: 2 INJECTION, SOLUTION INTRAMUSCULAR; INTRAVENOUS at 04:06

## 2020-06-23 RX ADMIN — SUGAMMADEX 200 MG: 100 INJECTION, SOLUTION INTRAVENOUS at 05:06

## 2020-06-23 RX ADMIN — LIDOCAINE HYDROCHLORIDE 0.02 MG/KG/MIN: 8 INJECTION, SOLUTION INTRAVENOUS at 01:06

## 2020-06-23 RX ADMIN — FENTANYL CITRATE 25 MCG: 50 INJECTION, SOLUTION INTRAMUSCULAR; INTRAVENOUS at 05:06

## 2020-06-23 RX ADMIN — FENTANYL CITRATE 25 MCG: 50 INJECTION, SOLUTION INTRAMUSCULAR; INTRAVENOUS at 04:06

## 2020-06-23 RX ADMIN — ROCURONIUM BROMIDE 10 MG: 10 INJECTION, SOLUTION INTRAVENOUS at 04:06

## 2020-06-23 RX ADMIN — SODIUM CHLORIDE, SODIUM GLUCONATE, SODIUM ACETATE, POTASSIUM CHLORIDE, MAGNESIUM CHLORIDE, SODIUM PHOSPHATE, DIBASIC, AND POTASSIUM PHOSPHATE: .53; .5; .37; .037; .03; .012; .00082 INJECTION, SOLUTION INTRAVENOUS at 03:06

## 2020-06-23 RX ADMIN — MIDAZOLAM HYDROCHLORIDE 2 MG: 1 INJECTION, SOLUTION INTRAMUSCULAR; INTRAVENOUS at 01:06

## 2020-06-23 RX ADMIN — HEPARIN SODIUM 5000 UNITS: 5000 INJECTION INTRAVENOUS; SUBCUTANEOUS at 11:06

## 2020-06-23 RX ADMIN — SODIUM CHLORIDE, SODIUM GLUCONATE, SODIUM ACETATE, POTASSIUM CHLORIDE, MAGNESIUM CHLORIDE, SODIUM PHOSPHATE, DIBASIC, AND POTASSIUM PHOSPHATE: .53; .5; .37; .037; .03; .012; .00082 INJECTION, SOLUTION INTRAVENOUS at 01:06

## 2020-06-23 RX ADMIN — LIDOCAINE HYDROCHLORIDE 100 MG: 20 INJECTION, SOLUTION INTRAVENOUS at 01:06

## 2020-06-23 NOTE — ANESTHESIA PROCEDURE NOTES
Intubation  Performed by: Caty Monique CRNA  Authorized by: Katlyn Griffith MD     Intubation:     Induction:  Intravenous    Intubated:  Postinduction    Mask Ventilation:  Easy mask    Attempts:  1    Attempted By:  CRNA    Method of Intubation:  Direct    Blade:  Jacinto 2    Laryngeal View Grade: Grade I - full view of chords      Difficult Airway Encountered?: No      Complications:  None    Airway Device:  Oral endotracheal tube    Airway Device Size:  7.5    Style/Cuff Inflation:  Cuffed (inflated to minimal occlusive pressure)    Inflation Amount (mL):  6    Tube secured:  22    Secured at:  The lips    Placement Verified By:  Capnometry    Complicating Factors:  None    Findings Post-Intubation:  BS equal bilateral and atraumatic/condition of teeth unchanged

## 2020-06-23 NOTE — TRANSFER OF CARE
"Anesthesia Transfer of Care Note    Patient: Moe Hinojosa    Procedure(s) Performed: Procedure(s) (LRB):  CLOSURE, COLOSTOMY, LAPAROSCOPIC, ERAS low (N/A)    Patient location: PACU    Anesthesia Type: general    Transport from OR: Transported from OR on 6-10 L/min O2 by face mask with adequate spontaneous ventilation    Post pain: adequate analgesia    Post assessment: no apparent anesthetic complications and tolerated procedure well    Post vital signs: stable    Level of consciousness: responds to stimulation and sedated    Nausea/Vomiting: no nausea/vomiting    Complications: none    Transfer of care protocol was followed      Last vitals:   Visit Vitals  /86 (BP Location: Left arm, Patient Position: Lying)   Pulse 88   Temp 36.8 °C (98.2 °F) (Oral)   Resp 16   Ht 5' 11" (1.803 m)   Wt 105.2 kg (232 lb)   SpO2 97%   BMI 32.36 kg/m²     "

## 2020-06-23 NOTE — BRIEF OP NOTE
[facility]  Brief Operative Note    SUMMARY     Surgery Date: 6/23/2020     Surgeon(s) and Role:     * Katie Spear MD - Primary     * Jessica Berrios MD - Resident - Assisting    Pre-op Diagnosis:  Diverticulitis [K57.92]    Post-op Diagnosis:  Post-Op Diagnosis Codes:     * Diverticulitis [K57.92]    Procedure(s) (LRB):  CLOSURE, COLOSTOMY, LAPAROSCOPIC, ERAS low (N/A)    Anesthesia: General    Description of Procedure:     1. Laparoscopic Colostomy takedown.   2. Flexible Sigmodoscopy    Description of the findings of the procedure:     Laparoscopic colostomy takedown. End to end stapled anastomosis. No air leak visualized during flex sig.     Estimated Blood Loss: 100 mL         Specimens:   Specimen (12h ago, onward)    None

## 2020-06-23 NOTE — PLAN OF CARE
Vital signs stable. Afebrile. Drowsy, oriented and following commands. Patient denies pain/nausea. Dressings remain CDI. [facility] in place. IVF d/c'd by MD. POC reviewed with patient and understanding verbalized. Wife updated via text. Awaiting transfer to [facility].

## 2020-06-23 NOTE — INTERVAL H&P NOTE
The patient has been examined and the H&P has been reviewed:    I concur with the findings and no changes have occurred since H&P was written.    Anesthesia/Surgery risks, benefits and alternative options discussed and understood by patient/family.          Active Hospital Problems    Diagnosis  POA    Colostomy in place [Z93.3]  Not Applicable      Resolved Hospital Problems   No resolved problems to display.

## 2020-06-23 NOTE — ANESTHESIA PREPROCEDURE EVALUATION
06/23/2020  Moe Hinojosa is a 50 y.o., male   Pre-operative evaluation for Procedure(s) (LRB):  CLOSURE, COLOSTOMY, LAPAROSCOPIC, ERAS low (N/A)    Moe Hinojosa is a 50 y.o. male presenting for colostomy reversal. He has a history of recurrent diverticulitis with abscess formation, eventually requiring ex-lap and sigmoid colon resection with colostomy in 11/2019. He has recovered well and now the plan is for reversal.      Patient Active Problem List   Diagnosis    Screening for malignant neoplasm of colon    Colostomy in place       Review of patient's allergies indicates:  No Known Allergies    No current facility-administered medications on file prior to encounter.      Current Outpatient Medications on File Prior to Encounter   Medication Sig Dispense Refill    gabapentin (NEURONTIN) 400 MG capsule Take 400 mg by mouth.         Past Surgical History:   Procedure Laterality Date    COLON SURGERY  11/13/2019    resection    COLONOSCOPY N/A 3/5/2020    Procedure: COLONOSCOPY;  Surgeon: Katie Spear MD;  Location: [address]);  Service: Endoscopy;  Laterality: N/A;    COLOSTOMY  11/13/2019    HERNIA REPAIR  2007     Labs pending    Anesthesia Evaluation    I have reviewed the Patient Summary Reports.   I have reviewed the NPO Status.   I have reviewed the Medications.     Review of Systems  Anesthesia Hx:  No problems with previous Anesthesia Denies Hx of Anesthetic complications  History of prior surgery of interest to airway management or planning: Denies Family Hx of Anesthesia complications.   Denies Personal Hx of Anesthesia complications.   Social:  No Alcohol Use, Non-Smoker    Hematology/Oncology:  Hematology Normal   Oncology Normal     EENT/Dental:EENT/Dental Normal   Cardiovascular:  Cardiovascular Normal Exercise tolerance: good     Pulmonary:  Pulmonary Normal     Renal/:  Renal/ Normal     Hepatic/GI:   Recurrent diverticulitis with abscess formation resulting in colon resection   Neurological:  Neurology Normal    Endocrine:  Endocrine Normal    Psych:  Psychiatric Normal           Physical Exam  General:  Well nourished    Airway/Jaw/Neck:  Airway Findings: Mouth Opening: Normal Tongue: Normal  General Airway Assessment: Adult, Average  Mallampati: II  TM Distance: Normal, at least 6 cm  Jaw/Neck Findings:  Neck ROM: Normal ROM      Dental:  Dental Findings: In tact   Chest/Lungs:  Chest/Lungs Findings: Normal Respiratory Rate, Clear to auscultation     Heart/Vascular:  Heart Findings: Rate: Normal  Rhythm: Regular Rhythm        Mental Status:  Mental Status Findings:  Alert and Oriented, Cooperative         Anesthesia Plan  Type of Anesthesia, risks & benefits discussed:  Anesthesia Type:  general  Patient's Preference:   Intra-op Monitoring Plan: standard ASA monitors  Intra-op Monitoring Plan Comments:   Post Op Pain Control Plan: per primary service following discharge from PACU, IV/PO Opioids PRN and multimodal analgesia  Post Op Pain Control Plan Comments:   Induction:   IV  Beta Blocker:  Patient is not currently on a Beta-Blocker (No further documentation required).       Informed Consent: Patient understands risks and agrees with Anesthesia plan.  Questions answered. Anesthesia consent signed with patient.  ASA Score: 2     Day of Surgery Review of History & Physical:    H&P update referred to the surgeon.         Ready For Surgery From Anesthesia Perspective.

## 2020-06-24 PROBLEM — E66.01 SEVERE OBESITY (BMI >= 40): Status: ACTIVE | Noted: 2020-06-24

## 2020-06-24 LAB
ANION GAP SERPL CALC-SCNC: 10 MMOL/L (ref 8–16)
BASOPHILS # BLD AUTO: 0.01 K/UL (ref 0–0.2)
BASOPHILS NFR BLD: 0.1 % (ref 0–1.9)
BUN SERPL-MCNC: 10 MG/DL (ref 6–20)
CALCIUM SERPL-MCNC: 8.5 MG/DL (ref 8.7–10.5)
CHLORIDE SERPL-SCNC: 105 MMOL/L (ref 95–110)
CO2 SERPL-SCNC: 24 MMOL/L (ref 23–29)
CREAT SERPL-MCNC: 1 MG/DL (ref 0.5–1.4)
DIFFERENTIAL METHOD: ABNORMAL
EOSINOPHIL # BLD AUTO: 0 K/UL (ref 0–0.5)
EOSINOPHIL NFR BLD: 0 % (ref 0–8)
ERYTHROCYTE [DISTWIDTH] IN BLOOD BY AUTOMATED COUNT: 12.5 % (ref 11.5–14.5)
EST. GFR  (AFRICAN AMERICAN): >60 ML/MIN/1.73 M^2
EST. GFR  (NON AFRICAN AMERICAN): >60 ML/MIN/1.73 M^2
GLUCOSE SERPL-MCNC: 124 MG/DL (ref 70–110)
HCT VFR BLD AUTO: 46.1 % (ref 40–54)
HGB BLD-MCNC: 14.7 G/DL (ref 14–18)
IMM GRANULOCYTES # BLD AUTO: 0.04 K/UL (ref 0–0.04)
IMM GRANULOCYTES NFR BLD AUTO: 0.3 % (ref 0–0.5)
LYMPHOCYTES # BLD AUTO: 1 K/UL (ref 1–4.8)
LYMPHOCYTES NFR BLD: 8.1 % (ref 18–48)
MAGNESIUM SERPL-MCNC: 2 MG/DL (ref 1.6–2.6)
MCH RBC QN AUTO: 29.9 PG (ref 27–31)
MCHC RBC AUTO-ENTMCNC: 31.9 G/DL (ref 32–36)
MCV RBC AUTO: 94 FL (ref 82–98)
MONOCYTES # BLD AUTO: 0.6 K/UL (ref 0.3–1)
MONOCYTES NFR BLD: 5 % (ref 4–15)
NEUTROPHILS # BLD AUTO: 10.3 K/UL (ref 1.8–7.7)
NEUTROPHILS NFR BLD: 86.5 % (ref 38–73)
NRBC BLD-RTO: 0 /100 WBC
PHOSPHATE SERPL-MCNC: 3.3 MG/DL (ref 2.7–4.5)
PLATELET # BLD AUTO: 205 K/UL (ref 150–350)
PMV BLD AUTO: 11.3 FL (ref 9.2–12.9)
POTASSIUM SERPL-SCNC: 4.6 MMOL/L (ref 3.5–5.1)
RBC # BLD AUTO: 4.91 M/UL (ref 4.6–6.2)
SODIUM SERPL-SCNC: 139 MMOL/L (ref 136–145)
WBC # BLD AUTO: 11.86 K/UL (ref 3.9–12.7)

## 2020-06-24 PROCEDURE — 94761 N-INVAS EAR/PLS OXIMETRY MLT: CPT

## 2020-06-24 PROCEDURE — 63600175 PHARM REV CODE 636 W HCPCS: Performed by: STUDENT IN AN ORGANIZED HEALTH CARE EDUCATION/TRAINING PROGRAM

## 2020-06-24 PROCEDURE — 97116 GAIT TRAINING THERAPY: CPT

## 2020-06-24 PROCEDURE — 85025 COMPLETE CBC W/AUTO DIFF WBC: CPT

## 2020-06-24 PROCEDURE — 25000003 PHARM REV CODE 250: Performed by: STUDENT IN AN ORGANIZED HEALTH CARE EDUCATION/TRAINING PROGRAM

## 2020-06-24 PROCEDURE — 83735 ASSAY OF MAGNESIUM: CPT

## 2020-06-24 PROCEDURE — 84100 ASSAY OF PHOSPHORUS: CPT

## 2020-06-24 PROCEDURE — 94799 UNLISTED PULMONARY SVC/PX: CPT

## 2020-06-24 PROCEDURE — 97161 PT EVAL LOW COMPLEX 20 MIN: CPT

## 2020-06-24 PROCEDURE — 36415 COLL VENOUS BLD VENIPUNCTURE: CPT

## 2020-06-24 PROCEDURE — 97165 OT EVAL LOW COMPLEX 30 MIN: CPT

## 2020-06-24 PROCEDURE — 20600001 HC STEP DOWN PRIVATE ROOM

## 2020-06-24 PROCEDURE — 80048 BASIC METABOLIC PNL TOTAL CA: CPT

## 2020-06-24 PROCEDURE — 99900035 HC TECH TIME PER 15 MIN (STAT)

## 2020-06-24 RX ADMIN — ACETAMINOPHEN 1000 MG: 10 INJECTION, SOLUTION INTRAVENOUS at 01:06

## 2020-06-24 RX ADMIN — ALVIMOPAN 12 MG: 12 CAPSULE ORAL at 09:06

## 2020-06-24 RX ADMIN — ENOXAPARIN SODIUM 40 MG: 40 INJECTION SUBCUTANEOUS at 04:06

## 2020-06-24 RX ADMIN — MUPIROCIN: 20 OINTMENT TOPICAL at 10:06

## 2020-06-24 RX ADMIN — ACETAMINOPHEN 1000 MG: 10 INJECTION, SOLUTION INTRAVENOUS at 05:06

## 2020-06-24 RX ADMIN — IBUPROFEN 800 MG: 400 TABLET, FILM COATED ORAL at 09:06

## 2020-06-24 RX ADMIN — IBUPROFEN 800 MG: 800 INJECTION INTRAVENOUS at 02:06

## 2020-06-24 RX ADMIN — ACETAMINOPHEN 1000 MG: 500 TABLET ORAL at 09:06

## 2020-06-24 RX ADMIN — ALVIMOPAN 12 MG: 12 CAPSULE ORAL at 10:06

## 2020-06-24 RX ADMIN — IBUPROFEN 800 MG: 800 INJECTION INTRAVENOUS at 05:06

## 2020-06-24 NOTE — PT/OT/SLP EVAL
"Physical Therapy Evaluation/Discharge    Patient Name:  Moe Hinojosa   MRN:  [id number]  Admit Date: 6/23/2020  Admitting Diagnosis:  Colostomy in place  Length of Stay: 1 days  Recent Surgery: Procedure(s) (LRB):  CLOSURE, COLOSTOMY, LAPAROSCOPIC, ERAS low (N/A) 1 Day Post-Op    Recommendations:     Discharge Recommendations:  home   Discharge Equipment Recommendations: none   Barriers to discharge: None    Assessment:     Moe Hinojosa is a 50 y.o. male admitted with a medical diagnosis of Colostomy in place. PT evaluation complete and no goals established. Pt is functioning at high level and does not required acute care physical therapy services. Education provided to ambulate in hallway 3x/day with RN in order to maintain strength and endurance. Pt verbalized understanding. Please re-consult if functional mobility changes. Anticipate d/c to home; no needs.       Problem List:    Rehab Prognosis: Good    Plan:     · D/c from acute PT    Subjective   Communicated with RN prior to session.  Patient found HOB elevated upon PT entry to room, agreeable to evaluation. Moe Hinojosa's wife present during session.    Chief Complaint: No chief complaint on file.    Patient/Family Comments/goals: to get better and return home   Pain/Comfort:  · Pain Rating 1: 0/10  · Pain Rating Post-Intervention 1: 0/10    Living Environment:  Patient lives with wife in a H with  to enter.   Prior Level of Function:   Patient reports being indep with mobility & with ADLs. Patient uses DME as follows: none. DME owned (not currently used): none.    Patient reports they will have assistance from wife upon discharge.    Objective:   Patient found with: peripheral IV    General Precautions: Standard, Cardiac fall   Orthopedic Precautions:N/A   Braces: N/A   Oxygen Device: Room Air  Vitals: /77 (Patient Position: Lying)   Pulse 95   Temp 97.7 °F (36.5 °C) (Oral)   Resp 18   Ht 5' 11" (1.803 m)   Wt 105.2 kg (232 lb)   SpO2 " 96%   BMI 32.36 kg/m²     Exams:  · Cognition:   · Alert and Cooperative  · Ox4  · Command following: Follows multistep  commands  · Fluency: clear/fluent    · RLE ROM: WFL  · RLE Strength: WFL  · LLE ROM: WFL  · LLE Strength: WFL    Outcome Measures:  AM-PAC 6 CLICK MOBILITY  Turning over in bed (including adjusting bedclothes, sheets and blankets)?: 4  Sitting down on and standing up from a chair with arms (e.g., wheelchair, bedside commode, etc.): 4  Moving from lying on back to sitting on the side of the bed?: 4  Moving to and from a bed to a chair (including a wheelchair)?: 4  Need to walk in hospital room?: 4  Climbing 3-5 steps with a railing?: 4  Basic Mobility Total Score: 24     Functional Mobility:  Additional staff present: OT  Bed Mobility:  · Supine to Sit: independence; HOB elevated  · Scooting anteriorly to EOB to have both feet planted on floor: independence    Sitting Balance at Edge of Bed:   Assistance Level Required: [place]   Time: 5 minutes    Transfers:   · Sit <> Stand Transfer: independence with no assistive device from EOB and toilet       Gait:   · Patient ambulated: 200ft   · Patient required: independent  · Patient used: no assistive device  · Gait Pattern observed: reciprocal gait  · Gait Deviation(s): NA  · Comments:   · Mask donned  · No LOB with horizontal head turns, change of speed, or change in direction  · No SOB    Therapeutic Activities, Exercises, & Education:   Educated pt on PT role/POC  Educated pt on importance of OOB activity and daily ambulation   Pt verbalized understanding     T/f to chair to increase tolerance to OOB activity and to create optimal positioning for lung expansion     Patient left up in chair with all lines intact, call button in reach, RN notified and wife present.    GOALS:   Multidisciplinary Problems     Physical Therapy Goals     Not on file          Multidisciplinary Problems (Resolved)        Problem: Physical Therapy Goal    Goal  Priority Disciplines Outcome Goal Variances Interventions   Physical Therapy Goal   (Resolved)     PT, PT/OT Met                     History:     Past Medical History:   Diagnosis Date    Diverticulitis 2019    Severe obesity (BMI >= 40) 6/24/2020       Past Surgical History:   Procedure Laterality Date    COLON SURGERY  11/13/2019    resection    COLONOSCOPY N/A 3/5/2020    Procedure: COLONOSCOPY;  Surgeon: Katie Spear MD;  Location: [facility] (4TH FLR);  Service: Endoscopy;  Laterality: N/A;    COLOSTOMY  11/13/2019    HERNIA REPAIR  2007    LAPAROSCOPIC CLOSURE OF COLOSTOMY N/A 6/23/2020    Procedure: CLOSURE, COLOSTOMY, LAPAROSCOPIC, ERAS low;  Surgeon: Katie Spear MD;  Location: [facility] OR 2ND FLR;  Service: Colon and Rectal;  Laterality: N/A;       Time Tracking:     PT Received On: 06/24/20  PT Start Time: 0810     PT Stop Time: 0830  PT Total Time (min): 20 min     Billable Minutes: Evaluation 10 and Gait Training 8    Pauline Parekh, PT, DPT  6/24/2020  [phone]

## 2020-06-24 NOTE — PLAN OF CARE
Plan of care reviewed with pt. Pt aox4, VS as charted. Purposeful rounding for pt care and safety. Pain controlled with PRN medication. No reports of NV. No falls/injury reported this shift. LLQ incision intact, serosang drainage noted. SCD in place. Safety precautions maintained - bed in low position, call light in reach, side rails up x2.

## 2020-06-24 NOTE — PROGRESS NOTES
[facility]  Colorectal Surgery  Progress Note    Patient Name: Moe Hinojosa  MRN: [id number]  Admission Date: 6/23/2020  Hospital Length of Stay: 1 days  Attending Physician: Katie Spear MD    Subjective:     Interval History: NAEON. Tolerated liquids overnight without nausea or vomiting. Pain well controlled. Has not yet ambulated. Not yet passing gas or having bowel movements.      Post-Op Info:  Procedure(s) (LRB):  CLOSURE, COLOSTOMY, LAPAROSCOPIC, ERAS low (N/A)   1 Day Post-Op      Medications:  Continuous Infusions:  Scheduled Meds:   acetaminophen  1,000 mg Intravenous Q8H    Followed by    acetaminophen  1,000 mg Oral Q8H    alvimopan  12 mg Oral BID    enoxaparin  40 mg Subcutaneous Q24H    gabapentin  400 mg Oral TID    ibuprofen  800 mg Intravenous Q8H    Followed by    ibuprofen  800 mg Oral Q8H    mupirocin   Nasal BID     PRN Meds:   ondansetron    oxyCODONE    oxyCODONE    promethazine (PHENERGAN) IVPB    sodium chloride 0.9%    traMADoL        Objective:     Vital Signs (Most Recent):  Temp: 97.7 °F (36.5 °C) (06/24/20 0815)  Pulse: 95 (06/24/20 0815)  Resp: 18 (06/24/20 0815)  BP: 130/77 (06/24/20 0815)  SpO2: 96 % (06/24/20 0815) Vital Signs (24h Range):  Temp:  [97.4 °F (36.3 °C)-98.2 °F (36.8 °C)] 97.7 °F (36.5 °C)  Pulse:  [] 95  Resp:  [14-20] 18  SpO2:  [91 %-99 %] 96 %  BP: (107-141)/(67-86) 130/77     Intake/Output - Last 3 Shifts       06/22 0700 - 06/23 0659 06/23 0700 - 06/24 0659 06/24 0700 - 06/25 0659    I.V. (mL/kg)  3585 (34.1)     Total Intake(mL/kg)  3585 (34.1)     Urine (mL/kg/hr)  1565 400 (2.8)    Stool  0     Blood  100     Total Output  1665 400    Net  +1920 -400           Stool Occurrence  0 x           Physical Exam  Vitals signs reviewed.   Constitutional:       Appearance: He is obese.   HENT:      Head: Normocephalic and atraumatic.   Eyes:      Extraocular Movements: Extraocular movements intact.       Conjunctiva/sclera: Conjunctivae normal.   Neck:      Musculoskeletal: Normal range of motion and neck supple.   Cardiovascular:      Rate and Rhythm: Normal rate.      Pulses: Normal pulses.   Pulmonary:      Effort: Pulmonary effort is normal. No respiratory distress.   Abdominal:      General: There is distension (mildly).      Palpations: Abdomen is soft.      Tenderness: There is abdominal tenderness (appropriate for post-operative state).      Comments: Ostomy closure site with OR bandage intact, clean and dry.  Incisions clean, dry, intact with no surrounding edema or drainage.    Musculoskeletal: Normal range of motion.         General: No deformity.   Skin:     General: Skin is warm and dry.   Neurological:      General: No focal deficit present.      Mental Status: He is alert and oriented to person, place, and time.       Significant Labs:  BMP (Last 3 Results):   Recent Labs   Lab 06/24/20  0436   *      K 4.6      CO2 24   BUN 10   CREATININE 1.0   CALCIUM 8.5*   MG 2.0     CBC (Last 3 Results):   Recent Labs   Lab 06/24/20  0436   WBC 11.86   RBC 4.91   HGB 14.7   HCT 46.1      MCV 94   MCH 29.9   MCHC 31.9*       Significant Diagnostics:  I have reviewed all pertinent imaging results/findings within the past 24 hours.    Assessment/Plan:     * Colostomy in place  - ERAS pathway  - Advance to low residue diet  - Will remove operative dressing tomorrow  - Encourage ambulation, OOBTC  - PT/OT  - Lovenox  - [facility] out this morning, follow up void check          Stephanie Willis MD  Colorectal Surgery  [facility]

## 2020-06-24 NOTE — PLAN OF CARE
Problem: Occupational Therapy Goal  Goal: Occupational Therapy Goal  Description: Pt is currently performing ADLs, functional mobility and transfers at baseline. OT services are not recommended at this time and pt is safe to discharge home.      Outcome: Met       Eval and D/C complete-see note for details    OFELIA Gaspar  6/24/2020   Pager #: [phone]

## 2020-06-24 NOTE — OP NOTE
[facility]  Operative Note    Date: 06/24/2020    Name: Moe Hinojosa    MRN: [id number]    Pre-Op Diagnosis: Diverticulitis, post Chelsea procedure    Post-Op Diagnosis:  Same    Procedure(s) Performed:   1.  Laparoscopic colostomy closure  2.  Flexible sigmoidoscopy  3.  Bilateral T AP blocks    Specimen(s):  Colostomy, rectosigmoid colon    Staff Surgeon: Katie Spear    Assistant Surgeon: Jessica Sethi (fellow)    Anesthesia: General    Indications: Moe Hinojosa is a 50 y.o. male who underwent a Chelsea procedure at an outside facility in November of 2019 for sigmoid diverticulitis with a large abscess.  His postoperative course was unremarkable.  He underwent a CT scan which revealed no residual infection, as well as a colonoscopy with me in March which demonstrated a small transverse colon polyp.  After discussion of risks and benefits he agreed to proceed with reversal of this colostomy.    Details of procedure:  Patient was taken to the operating room and transferred onto the OR table.  SCD boots were applied and IV antibiotics were administered.  He was placed under general endotracheal anesthesia.  A Duke catheter was placed and he was put into lithotomy position.  His abdomen was prepped and draped in the standard sterile fashion.  After an appropriate time-out, a circular incision was made at the mucocutaneous junction around his colostomy.  We then dissected the colostomy free from the subcutaneous fat and fascia, until we entered the abdominal cavity.  We confirmed that there were no remaining attachments to the underside of the fascia.  We then stapled the end of the colostomy with a blue load of the MAVERICK 75 stapler and placed it into the abdomen.  A gel point wound protector and cap were placed, and the abdomen was inflated.  We then performed a diagnostic laparoscopy the omentum, stomach, peritoneum, and small bowel appeared normal.  We could see the stapled end of the  sigmoid colon marked with a Prolene suture within the pelvis.  Filmy adhesions from 2 loops of small bowel to the anterior abdominal wall were taken down with scissors.  Otherwise his adhesions were minimal.  We then placed 2 additional 5 mm ports in the right lower quadrant and right upper quadrant.  We placed bilateral T AP blocks using Exparel.    The patient was then placed into Trendelenburg position and we turned our attention to the pelvis.  We took down the lateral attachments of the sigmoid colon and were able to identify and preserve the left ureter.  We then mobilized the peritoneum along the lateral sides of the rectosigmoid colon.  Once this was done we passed an EEA Sizer up to the rectum and identified the rectosigmoid junction at the level of the sacral promontory, and confirmed that this was where the tinea were splaying.  We then began to create a posterior mesenteric window at this level, working from both the right and the left sides.  Once we had a window posterior to the rectum, we exchanged our right lower quadrant port for a 12 mm port and divided the upper rectum with a purple load of the Endo-MAVERICK stapler.  The remaining sigmoid mesentery was divided with the ligature.  The rectosigmoid colon was then extracted through the gel point port and passed off for pathology.  We carefully examined the rectal staple line to confirm that this was intact and there was no evidence of any injury.  We then turned our attention to the descending colon.  We began to take down the scar tissue along the lateral attachments of the descending colon with the ligature.  We  the retroperitoneum from the descending colon mesentery, completely medializing the descending colon.  We worked up towards the splenic flexure and  the omentum from the flexure and descending colon.  We divided some of the splenocolic attachments.  At this time we assessed our mobilization, and found that the divided end of  the distal colon dropped down to reach the divided end of the rectum without any tension.    We then removed the cap from the gel point and brought up the end of the distal colon.  We cleaned the mesenteric fat from the distal and in excised our MAVERICK staple line.  We placed a pursestring suture of Prolene, and placed the anvil of the 28 EEA stapler into the colon, securing it with the pursestring.  We then placed this back into the abdomen and reinflated.  Dr. Sethi then went below and placed the EEA stapler through the rectum to the staple line.  The spike was brought out just posterior to the rectal staple line, the stapler was mated and fired.  The stapler was then withdrawn and we confirmed that there were 2 intact donuts.  We then filled the pelvis with saline and took the patient out of Trendelenburg.  The proximal descending colon was gently occluded with a grasper.  Flexible sigmoidoscopy was performed, and we confirmed that there was an intact staple line with no evidence of air leak.  The sigmoidoscope was then withdrawn. We then closed the 12mm port with a Vicryl suture under laparoscopic vision.    We then changed our gowns and gloves and brought clean instruments onto the field. The fascia of the colostomy site was closed in 2 running layers, with vicryl on posterior and PDS on anterior.  The skin was closed with a purse-string of Vicryl and packed with gauze.  The port site skin was closed with Monocryl and skin glue.  The procedure was then terminated.  All instrument and sponge counts were correct.  I was present and scrubbed for the entire procedure.  The patient was awakened and transferred to recovery in good condition.    Estimated Blood Loss: 100 mL     Drains/Implants: None    Wound Class: II    Katie Spear

## 2020-06-24 NOTE — PROGRESS NOTES
[facility]  Colorectal Surgery  Progress Note    Patient Name: Moe Hinojosa  MRN: [id number]  Admission Date: 6/23/2020  Hospital Length of Stay: 1 days  Attending Physician: Katie Spear MD    Subjective:     Interval History: see note from Dr. Willis    Post-Op Info:  Procedure(s) (LRB):  CLOSURE, COLOSTOMY, LAPAROSCOPIC, ERAS low (N/A)   1 Day Post-Op      Medications:  Continuous Infusions:  Scheduled Meds:   acetaminophen  1,000 mg Intravenous Q8H    Followed by    acetaminophen  1,000 mg Oral Q8H    alvimopan  12 mg Oral BID    enoxaparin  40 mg Subcutaneous Q24H    gabapentin  400 mg Oral TID    ibuprofen  800 mg Intravenous Q8H    Followed by    ibuprofen  800 mg Oral Q8H    mupirocin   Nasal BID     PRN Meds:   ondansetron    oxyCODONE    oxyCODONE    promethazine (PHENERGAN) IVPB    sodium chloride 0.9%    traMADoL        Objective:     Vital Signs (Most Recent):  Temp: 97.7 °F (36.5 °C) (06/24/20 0815)  Pulse: 95 (06/24/20 0815)  Resp: 18 (06/24/20 0815)  BP: 130/77 (06/24/20 0815)  SpO2: 96 % (06/24/20 0815) Vital Signs (24h Range):  Temp:  [97.4 °F (36.3 °C)-98.2 °F (36.8 °C)] 97.7 °F (36.5 °C)  Pulse:  [] 95  Resp:  [14-20] 18  SpO2:  [91 %-99 %] 96 %  BP: (107-141)/(67-86) 130/77     Intake/Output - Last 3 Shifts       06/22 0700 - 06/23 0659 06/23 0700 - 06/24 0659 06/24 0700 - 06/25 0659    I.V. (mL/kg)  3585 (34.1)     Total Intake(mL/kg)  3585 (34.1)     Urine (mL/kg/hr)  1565 400 (1.1)    Stool  0 0    Blood  100     Total Output  1665 400    Net  +1920 -400           Urine Occurrence   1 x    Stool Occurrence  0 x 1 x          Physical Exam          Assessment/Plan:     * Colostomy in place  - ERAS pathway  - Advance to low residue diet  - Will remove operative dressing tomorrow  - Encourage ambulation, OOBTC  - PT/OT  - Lovenox  - Duke out this morning, follow up void check    Severe obesity (BMI >= 40)  Body mass index is 32.36 kg/m². Morbid  obesity complicates all aspects of disease management from diagnostic modalities to treatment. Weight loss encouraged and health benefits explained to patient.          Priscila Newman NP  Colorectal Surgery  [facility]

## 2020-06-24 NOTE — PLAN OF CARE
CM to pt's bedside and spoke with pt and pt's wife Augustina to complete DC assessment.  Pt lives in [place] with his wife in a single story home, steps to enter the home, has dogs.  No equipment at home, independent functional status.  DC plans when medically stable are return home with wife.  She is currently staying in the [facility] and will provide transportation home.      Tawanda Urrutia MD     Payor: [facility] BLUE [facility] / Plan: BCBS ALL OUT OF STATE / Product Type: PPO /        [facility]  [address]  [facility] [address]  Phone: [phone] Fax: [phone]    [facility] - [place], [place] - [address]  [address]  [address]  [place] [address]  Phone: [phone] Fax: [phone]    [facility]  [address]  [facility] [address]  Phone: [phone] Fax: [phone]        06/24/20 1647   Discharge Assessment   Assessment Type Discharge Planning Assessment   Confirmed/corrected address and phone number on facesheet? Yes   Assessment information obtained from? Patient   Expected Length of Stay (days) 2   Communicated expected length of stay with patient/caregiver yes   Prior to hospitilization cognitive status: Alert/Oriented   Prior to hospitalization functional status: Independent   Current cognitive status: Alert/Oriented   Current Functional Status: Independent   Facility Arrived From: NA   Lives With spouse  (Wife Augustina)   Able to Return to Prior Arrangements yes   Is patient able to care for self after discharge? Yes   Who are your caregiver(s) and their phone number(s)? Wife:  Augustina Hinojosa [phone]   Patient's perception of discharge disposition home or selfcare   Readmission Within the Last 30 Days no previous admission in last 30 days   Patient currently being followed by outpatient case management? No   Patient currently receives any other outside agency services? No   Equipment Currently Used at Home none   Part D Coverage NA   Do  you have any problems affording any of your prescribed medications? No   Is the patient taking medications as prescribed? yes   Does the patient have transportation home? Yes  (Wife Augustina)   Transportation Anticipated family or friend will provide   Dialysis Name and Scheduled days NA   Does the patient receive services at the [facility]madin Clinic? No   Discharge Plan A Home with family   Discharge Plan B Home with family   DME Needed Upon Discharge  none   Patient/Family in Agreement with Plan yes     Mariah Junior RN Case Management  [phone]

## 2020-06-24 NOTE — ASSESSMENT & PLAN NOTE
- ERAS pathway  - Advance to low residue diet  - Will remove operative dressing tomorrow  - Encourage ambulation, OOBTC  - PT/OT  - Lovenox  - [facility] out this morning, follow up void check

## 2020-06-24 NOTE — SUBJECTIVE & OBJECTIVE
Subjective:     Interval History: see note from Dr. Willis    Post-Op Info:  Procedure(s) (LRB):  CLOSURE, COLOSTOMY, LAPAROSCOPIC, ERAS low (N/A)   1 Day Post-Op      Medications:  Continuous Infusions:  Scheduled Meds:   acetaminophen  1,000 mg Intravenous Q8H    Followed by    acetaminophen  1,000 mg Oral Q8H    alvimopan  12 mg Oral BID    enoxaparin  40 mg Subcutaneous Q24H    gabapentin  400 mg Oral TID    ibuprofen  800 mg Intravenous Q8H    Followed by    ibuprofen  800 mg Oral Q8H    mupirocin   Nasal BID     PRN Meds:   ondansetron    oxyCODONE    oxyCODONE    promethazine (PHENERGAN) IVPB    sodium chloride 0.9%    traMADoL        Objective:     Vital Signs (Most Recent):  Temp: 97.7 °F (36.5 °C) (06/24/20 0815)  Pulse: 95 (06/24/20 0815)  Resp: 18 (06/24/20 0815)  BP: 130/77 (06/24/20 0815)  SpO2: 96 % (06/24/20 0815) Vital Signs (24h Range):  Temp:  [97.4 °F (36.3 °C)-98.2 °F (36.8 °C)] 97.7 °F (36.5 °C)  Pulse:  [] 95  Resp:  [14-20] 18  SpO2:  [91 %-99 %] 96 %  BP: (107-141)/(67-86) 130/77     Intake/Output - Last 3 Shifts       06/22 0700 - 06/23 0659 06/23 0700 - 06/24 0659 06/24 0700 - 06/25 0659    I.V. (mL/kg)  3585 (34.1)     Total Intake(mL/kg)  3585 (34.1)     Urine (mL/kg/hr)  1565 400 (1.1)    Stool  0 0    Blood  100     Total Output  1665 400    Net  +1920 -400           Urine Occurrence   1 x    Stool Occurrence  0 x 1 x          Physical Exam

## 2020-06-24 NOTE — NURSING
PT arrived to floor via stretcher, VSS, AOX4, drowsy. Oriented to ALVIN DECKER, report given to oncoming Nightshift nurse.

## 2020-06-24 NOTE — NURSING TRANSFER
Nursing Transfer Note      6/23/2020     Transfer 1051    Transfer via bed    Transfer with portable tele    Transported by pct x2    Medicines sent: n/a    Chart send with patient: yes    Notified: wife    Patient reassessed at: 6/23/20 @ 1903

## 2020-06-24 NOTE — PLAN OF CARE
POC reviewed with patient who verbalized understanding. VSS on RA. AAOX4. Remains free of falls and injury. Pt up independently.     - RLQ incision packed - changed outer gauze twice due to leakage, left packing.  - stewart removed, voiding  - BM X 4 today, liquid loose     IVF  d/c. Tolerating low fiber diet, denies nausea. Pain controlled. Telemetry d/c. Educated on IS use. Patient denies chest pain & SOB. TEDS and SCDS in place. No acute events. No distress noted. Bed in lowest position, call light within reach, frequent rounds made for safety.     WCTM.

## 2020-06-24 NOTE — PLAN OF CARE
POC reviewed with patient who verbalized understanding. VSS. AAOX4. Remains free of falls and injury.     Incisions and drains.     IVF and drips. Tolerating diet, denies nausea. Pain controlled with PRN medications per MAR. Telemetry being monitored running NSR. Blood glucose being monitored every 6 hours with coverage needed. Educated on IS use. Patient denies chest pain & SOB. TEDS and SCDS in place. No acute events. No distress noted. Bed in lowest position, call light within reach, frequent rounds made for safety.     WCTM.

## 2020-06-24 NOTE — PT/OT/SLP EVAL
Occupational Therapy   Evaluation and Discharge Note    Name: Moe Hinojosa  MRN: [id number]  Admitting Diagnosis:  Colostomy in place 1 Day Post-Op    Recommendations:     Discharge Recommendations: home  Discharge Equipment Recommendations:  none  Barriers to discharge:  None    Assessment:     Moe Hinojosa is a 50 y.o. male with a medical diagnosis of Colostomy in place. At this time, patient is functioning at their prior level of function and does not require further acute OT services.     Plan:     During this hospitalization, patient does not require further acute OT services.  Please re-consult if situation changes.    · Plan of Care Reviewed with: patient, spouse    Subjective     Chief Complaint: none stated  Patient/Family Comments/goals: to get better and return home    Occupational Profile:  Living Environment: Pt lives with his wife in a H with threshold to enter  Previous level of function: PTA, pt was independent with self-care and functional mobility  Roles and Routines: Pt enjoys gardening, taking care of his land, and spending time with his dogs  Equipment Used at home:  none  Assistance upon Discharge: Upon discharge, pt will have assistance from his spouse    Pain/Comfort:  · Pain Rating 1: 0/10    Patients cultural, spiritual, Confucianism conflicts given the current situation: no    Objective:     Communicated with: RN and PT prior to session.  Patient found HOB elevated with telemetry, peripheral IV upon OT entry to room. Co-evaluation with PT    General Precautions: Standard, fall   Orthopedic Precautions:N/A   Braces: N/A     Occupational Performance:    Bed Mobility:    · Patient completed Supine to Sit with modified independence with HOB elevated    Functional Mobility/Transfers:  · Patient completed Sit <> Stand Transfer with independence  with  no assistive device   · Patient completed Toilet Transfer Step Transfer technique with independence with  no AD  · Functional Mobility: Pt  ambulated > household distance with independence and no AD. No LOB, SOB, or c/o of dizziness    Activities of Daily Living:  · Upper Body Dressing: stand by assistance to doff/don gown 2* line management    Cognitive/Visual Perceptual:  Cognitive/Psychosocial Skills:     -       Oriented to: Person, Place, Time and Situation   -       Follows Commands/attention:Follows multistep  commands  -       Communication: clear/fluent  -       Memory: No Deficits noted  -       Safety awareness/insight to disability: intact   -       Mood/Affect/Coping skills/emotional control: Appropriate to situation    Physical Exam:  Upper Extremity Range of Motion:     -       Right Upper Extremity: WFL  -       Left Upper Extremity: WFL  Upper Extremity Strength:    -       Right Upper Extremity: WFL  -       Left Upper Extremity: WFL   Strength:    -       Right Upper Extremity: WFL  -       Left Upper Extremity: WFL  Fine Motor Coordination:    -       Intact    AMPAC 6 Click ADL:  AMPAC Total Score: 24    Treatment & Education:  -Therapist provided facilitation and instruction of proper body mechanics, energy conservation, and fall prevention strategies during tasks listed above.  -Pt educated on role of OT, POC and discharge from acute OT  -Pt educated on importance of OOB activities with staff member assistance and sitting OOB majority of the day.   -Pt verbalized understanding. Pt expressed no further concerns/questions  -Whiteboard updated  Education:    Patient left up in chair with all lines intact, call button in reach and spouse present    GOALS:   Multidisciplinary Problems     Occupational Therapy Goals     Not on file          Multidisciplinary Problems (Resolved)        Problem: Occupational Therapy Goal    Goal Priority Disciplines Outcome Interventions   Occupational Therapy Goal   (Resolved)     OT, PT/OT Met    Description: Pt is currently performing ADLs, functional mobility and transfers at baseline. OT services  are not recommended at this time and pt is safe to discharge home.                       History:     Past Medical History:   Diagnosis Date    Diverticulitis 2019       Past Surgical History:   Procedure Laterality Date    COLON SURGERY  11/13/2019    resection    COLONOSCOPY N/A 3/5/2020    Procedure: COLONOSCOPY;  Surgeon: Katie Spear MD;  [facility]  Service: Endoscopy;  Laterality: N/A;    COLOSTOMY  11/13/2019    HERNIA REPAIR  2007    LAPAROSCOPIC CLOSURE OF COLOSTOMY N/A 6/23/2020    Procedure: CLOSURE, COLOSTOMY, LAPAROSCOPIC, ERAS low;  Surgeon: Katie Spear MD;  Location: [address];  Service: Colon and Rectal;  Laterality: N/A;       Time Tracking:     OT Date of Treatment: 06/24/20  OT Start Time: 0811  OT Stop Time: 0826  OT Total Time (min): 15 min    Billable Minutes:Evaluation 15    Olga Becerra, OT  6/24/2020

## 2020-06-24 NOTE — ANESTHESIA POSTPROCEDURE EVALUATION
Anesthesia Post Evaluation    Patient: Moe Hinojosa    Procedure(s) Performed: Procedure(s) (LRB):  CLOSURE, COLOSTOMY, LAPAROSCOPIC, ERAS low (N/A)    Final Anesthesia Type: general    Patient location during evaluation: PACU  Patient participation: Yes- Able to Participate  Level of consciousness: awake and alert  Post-procedure vital signs: reviewed and stable  Pain management: adequate  Airway patency: patent    PONV status at discharge: No PONV  Anesthetic complications: no      Cardiovascular status: blood pressure returned to baseline  Respiratory status: unassisted  Hydration status: euvolemic  Follow-up not needed.          Vitals Value Taken Time   /77 06/24/20 0815   Temp 36.5 °C (97.7 °F) 06/24/20 0815   Pulse 95 06/24/20 0815   Resp 11 06/24/20 0815   SpO2 96 % 06/24/20 0815         Event Time   Out of Recovery 19:00:00         Pain/Ana Score: Pain Rating Prior to Med Admin: 0 (6/24/2020  5:09 AM)  Ana Score: 9 (6/23/2020  7:00 PM)

## 2020-06-24 NOTE — ASSESSMENT & PLAN NOTE
Body mass index is 32.36 kg/m². Morbid obesity complicates all aspects of disease management from diagnostic modalities to treatment. Weight loss encouraged and health benefits explained to patient.

## 2020-06-24 NOTE — SUBJECTIVE & OBJECTIVE
Subjective:     Interval History: NAEON. Tolerated liquids overnight without nausea or vomiting. Pain well controlled. Has not yet ambulated. Not yet passing gas or having bowel movements.      Post-Op Info:  Procedure(s) (LRB):  CLOSURE, COLOSTOMY, LAPAROSCOPIC, ERAS low (N/A)   1 Day Post-Op      Medications:  Continuous Infusions:  Scheduled Meds:   acetaminophen  1,000 mg Intravenous Q8H    Followed by    acetaminophen  1,000 mg Oral Q8H    alvimopan  12 mg Oral BID    enoxaparin  40 mg Subcutaneous Q24H    gabapentin  400 mg Oral TID    ibuprofen  800 mg Intravenous Q8H    Followed by    ibuprofen  800 mg Oral Q8H    mupirocin   Nasal BID     PRN Meds:   ondansetron    oxyCODONE    oxyCODONE    promethazine (PHENERGAN) IVPB    sodium chloride 0.9%    traMADoL        Objective:     Vital Signs (Most Recent):  Temp: 97.7 °F (36.5 °C) (06/24/20 0815)  Pulse: 95 (06/24/20 0815)  Resp: 18 (06/24/20 0815)  BP: 130/77 (06/24/20 0815)  SpO2: 96 % (06/24/20 0815) Vital Signs (24h Range):  Temp:  [97.4 °F (36.3 °C)-98.2 °F (36.8 °C)] 97.7 °F (36.5 °C)  Pulse:  [] 95  Resp:  [14-20] 18  SpO2:  [91 %-99 %] 96 %  BP: (107-141)/(67-86) 130/77     Intake/Output - Last 3 Shifts       06/22 0700 - 06/23 0659 06/23 0700 - 06/24 0659 06/24 0700 - 06/25 0659    I.V. (mL/kg)  3585 (34.1)     Total Intake(mL/kg)  3585 (34.1)     Urine (mL/kg/hr)  1565 400 (2.8)    Stool  0     Blood  100     Total Output  1665 400    Net  +1920 -400           Stool Occurrence  0 x           Physical Exam  Vitals signs reviewed.   Constitutional:       Appearance: He is obese.   HENT:      Head: Normocephalic and atraumatic.   Eyes:      Extraocular Movements: Extraocular movements intact.      Conjunctiva/sclera: Conjunctivae normal.   Neck:      Musculoskeletal: Normal range of motion and neck supple.   Cardiovascular:      Rate and Rhythm: Normal rate.      Pulses: Normal pulses.   Pulmonary:      Effort: Pulmonary effort is  normal. No respiratory distress.   Abdominal:      General: There is distension (mildly).      Palpations: Abdomen is soft.      Tenderness: There is abdominal tenderness (appropriate for post-operative state).      Comments: Ostomy closure site with OR bandage intact, clean and dry.  Incisions clean, dry, intact with no surrounding edema or drainage.    Musculoskeletal: Normal range of motion.         General: No deformity.   Skin:     General: Skin is warm and dry.   Neurological:      General: No focal deficit present.      Mental Status: He is alert and oriented to person, place, and time.       Significant Labs:  BMP (Last 3 Results):   Recent Labs   Lab 06/24/20  0436   *      K 4.6      CO2 24   BUN 10   CREATININE 1.0   CALCIUM 8.5*   MG 2.0     CBC (Last 3 Results):   Recent Labs   Lab 06/24/20  0436   WBC 11.86   RBC 4.91   HGB 14.7   HCT 46.1      MCV 94   MCH 29.9   MCHC 31.9*       Significant Diagnostics:  I have reviewed all pertinent imaging results/findings within the past 24 hours.

## 2020-06-25 ENCOUNTER — TELEPHONE (OUTPATIENT)
Dept: SURGERY | Facility: CLINIC | Age: 50
End: 2020-06-25

## 2020-06-25 VITALS
HEIGHT: 71 IN | SYSTOLIC BLOOD PRESSURE: 130 MMHG | BODY MASS INDEX: 32.48 KG/M2 | WEIGHT: 232 LBS | RESPIRATION RATE: 17 BRPM | DIASTOLIC BLOOD PRESSURE: 80 MMHG | TEMPERATURE: 97 F | HEART RATE: 77 BPM | OXYGEN SATURATION: 98 %

## 2020-06-25 LAB — CRP SERPL-MCNC: 23 MG/L (ref 0–8.2)

## 2020-06-25 PROCEDURE — 86140 C-REACTIVE PROTEIN: CPT

## 2020-06-25 PROCEDURE — 25000003 PHARM REV CODE 250: Performed by: STUDENT IN AN ORGANIZED HEALTH CARE EDUCATION/TRAINING PROGRAM

## 2020-06-25 PROCEDURE — 36415 COLL VENOUS BLD VENIPUNCTURE: CPT

## 2020-06-25 RX ORDER — OXYCODONE HYDROCHLORIDE 5 MG/1
5 TABLET ORAL EVERY 4 HOURS PRN
Qty: 30 TABLET | Refills: 0 | Status: SHIPPED | OUTPATIENT
Start: 2020-06-25

## 2020-06-25 RX ORDER — ONDANSETRON 4 MG/1
4 TABLET, FILM COATED ORAL EVERY 6 HOURS PRN
Qty: 30 TABLET | Refills: 0 | Status: SHIPPED | OUTPATIENT
Start: 2020-06-25

## 2020-06-25 RX ADMIN — ACETAMINOPHEN 1000 MG: 500 TABLET ORAL at 05:06

## 2020-06-25 RX ADMIN — IBUPROFEN 800 MG: 400 TABLET, FILM COATED ORAL at 05:06

## 2020-06-25 NOTE — SUBJECTIVE & OBJECTIVE
Subjective:     Interval History: NAEON. Tolerated regular without nausea or vomiting. Pain well controlled. Ambulating without difficulty.     Post-Op Info:  Procedure(s) (LRB):  CLOSURE, COLOSTOMY, LAPAROSCOPIC, ERAS low (N/A)   2 Days Post-Op      Medications:  Continuous Infusions:  Scheduled Meds:   acetaminophen  1,000 mg Oral Q8H    alvimopan  12 mg Oral BID    enoxaparin  40 mg Subcutaneous Q24H    gabapentin  400 mg Oral TID    ibuprofen  800 mg Oral Q8H    mupirocin   Nasal BID     PRN Meds:   ondansetron    oxyCODONE    oxyCODONE    promethazine (PHENERGAN) IVPB    sodium chloride 0.9%    traMADoL        Objective:     Vital Signs (Most Recent):  Temp: 97 °F (36.1 °C) (06/25/20 0728)  Pulse: 77 (06/25/20 0728)  Resp: 17 (06/25/20 0728)  BP: 130/80 (06/25/20 0728)  SpO2: 98 % (06/25/20 0728) Vital Signs (24h Range):  Temp:  [97 °F (36.1 °C)-98.3 °F (36.8 °C)] 97 °F (36.1 °C)  Pulse:  [76-96] 77  Resp:  [17-20] 17  SpO2:  [94 %-98 %] 98 %  BP: (122-135)/(72-94) 130/80     Intake/Output - Last 3 Shifts       06/23 0700 - 06/24 0659 06/24 0700 - 06/25 0659 06/25 0700 - 06/26 0659    P.O.  600     I.V. (mL/kg) 3585 (34.1)      IV Piggyback  1050     Total Intake(mL/kg) 3585 (34.1) 1650 (15.7)     Urine (mL/kg/hr) 1565 400 (0.2)     Stool 0 0     Blood 100      Total Output 1665 400     Net +1920 +1250            Urine Occurrence  6 x     Stool Occurrence 0 x 6 x           Physical Exam  Vitals signs reviewed.   Constitutional:       Appearance: He is obese.   HENT:      Head: Normocephalic and atraumatic.   Eyes:      Extraocular Movements: Extraocular movements intact.      Conjunctiva/sclera: Conjunctivae normal.   Neck:      Musculoskeletal: Normal range of motion and neck supple.   Cardiovascular:      Rate and Rhythm: Normal rate.      Pulses: Normal pulses.   Pulmonary:      Effort: Pulmonary effort is normal. No respiratory distress.   Abdominal:      General: There is no distension  (mildly).      Palpations: Abdomen is soft.      Tenderness: There is abdominal tenderness (improved).      Comments: Ostomy closure site with OR bandage intact, clean and dry.  Incisions clean, dry, intact with no surrounding edema or drainage.    Musculoskeletal: Normal range of motion.         General: No deformity.   Skin:     General: Skin is warm and dry.   Neurological:      General: No focal deficit present.      Mental Status: He is alert and oriented to person, place, and time.       Significant Labs:  BMP (Last 3 Results):   Recent Labs   Lab 06/24/20  0436   *      K 4.6      CO2 24   BUN 10   CREATININE 1.0   CALCIUM 8.5*   MG 2.0     CBC (Last 3 Results):   Recent Labs   Lab 06/24/20  0436   WBC 11.86   RBC 4.91   HGB 14.7   HCT 46.1      MCV 94   MCH 29.9   MCHC 31.9*       Significant Diagnostics:  I have reviewed all pertinent imaging results/findings within the past 24 hours.

## 2020-06-25 NOTE — HOSPITAL COURSE
SERJIO MONIQUE 50 y.o.male underwent: Procedure(s) (LRB):  CLOSURE, COLOSTOMY, LAPAROSCOPIC, ERAS low (N/A). Tolerated procedure well, was transferred to  then to regular floor post op. Please see the dictated operative note for further procedure details.   Hospital course was uncomplicated.   Vitals remained stable, afebrile. Labs were reviewed and electrolytes replaced appropriately.   Physical exam was appropriate for post operative state.   Was able to tolerate a regular diet as it was advanced in an appropriate surgical manner.   Was able to ambulate and void without difficulty prior to discharge.  Pain and nausea controlled with PRN medications.   Deemed suitable for discharge on 2 Days Post-Op

## 2020-06-25 NOTE — PLAN OF CARE
Pt discharged home, transportation provided by wife.  No needs.    Future Appointments   Date Time Provider Department Center   7/13/2020  9:20 AM Katie Spear MD [facility]           06/25/20 1203   Final Note   Assessment Type Final Discharge Note   Anticipated Discharge Disposition Home   Hospital Follow Up  Appt(s) scheduled? Yes   Discharge plans and expectations educations in teach back method with documentation complete? Yes     Mariah Junior RN Case Management  [phone]

## 2020-06-25 NOTE — PLAN OF CARE
06/25/20 1009   Post-Acute Status   Post-Acute Authorization Other   Other Status No Post-Acute Service Needs   This SW in communication with  and medical team. SW will continue to follow for discharge needs and offer support as needed.    No SW needs identified at this time.    Ksenia Ty LMSW  [facility]  [id number]

## 2020-06-25 NOTE — PROGRESS NOTES
[facility]  Colorectal Surgery  Progress Note    Patient Name: Moe Hinojosa  MRN: [id number]  Admission Date: 6/23/2020  Hospital Length of Stay: 2 days  Attending Physician: Katie Spear MD    Subjective:     Interval History: NAEON. Tolerated regular without nausea or vomiting. Pain well controlled. Ambulating without difficulty.     Post-Op Info:  Procedure(s) (LRB):  CLOSURE, COLOSTOMY, LAPAROSCOPIC, ERAS low (N/A)   2 Days Post-Op      Medications:  Continuous Infusions:  Scheduled Meds:   acetaminophen  1,000 mg Oral Q8H    alvimopan  12 mg Oral BID    enoxaparin  40 mg Subcutaneous Q24H    gabapentin  400 mg Oral TID    ibuprofen  800 mg Oral Q8H    mupirocin   Nasal BID     PRN Meds:   ondansetron    oxyCODONE    oxyCODONE    promethazine (PHENERGAN) IVPB    sodium chloride 0.9%    traMADoL        Objective:     Vital Signs (Most Recent):  Temp: 97 °F (36.1 °C) (06/25/20 0728)  Pulse: 77 (06/25/20 0728)  Resp: 17 (06/25/20 0728)  BP: 130/80 (06/25/20 0728)  SpO2: 98 % (06/25/20 0728) Vital Signs (24h Range):  Temp:  [97 °F (36.1 °C)-98.3 °F (36.8 °C)] 97 °F (36.1 °C)  Pulse:  [76-96] 77  Resp:  [17-20] 17  SpO2:  [94 %-98 %] 98 %  BP: (122-135)/(72-94) 130/80     Intake/Output - Last 3 Shifts       06/23 0700 - 06/24 0659 06/24 0700 - 06/25 0659 06/25 0700 - 06/26 0659    P.O.  600     I.V. (mL/kg) 3585 (34.1)      IV Piggyback  1050     Total Intake(mL/kg) 3585 (34.1) 1650 (15.7)     Urine (mL/kg/hr) 1565 400 (0.2)     Stool 0 0     Blood 100      Total Output 1665 400     Net +1920 +1250            Urine Occurrence  6 x     Stool Occurrence 0 x 6 x           Physical Exam  Vitals signs reviewed.   Constitutional:       Appearance: He is obese.   HENT:      Head: Normocephalic and atraumatic.   Eyes:      Extraocular Movements: Extraocular movements intact.      Conjunctiva/sclera: Conjunctivae normal.   Neck:      Musculoskeletal: Normal range of motion and neck  supple.   Cardiovascular:      Rate and Rhythm: Normal rate.      Pulses: Normal pulses.   Pulmonary:      Effort: Pulmonary effort is normal. No respiratory distress.   Abdominal:      General: There is no distension (mildly).      Palpations: Abdomen is soft.      Tenderness: There is abdominal tenderness (improved).      Comments: Ostomy closure site with OR bandage intact, clean and dry.  Incisions clean, dry, intact with no surrounding edema or drainage.    Musculoskeletal: Normal range of motion.         General: No deformity.   Skin:     General: Skin is warm and dry.   Neurological:      General: No focal deficit present.      Mental Status: He is alert and oriented to person, place, and time.       Significant Labs:  BMP (Last 3 Results):   Recent Labs   Lab 06/24/20  0436   *      K 4.6      CO2 24   BUN 10   CREATININE 1.0   CALCIUM 8.5*   MG 2.0     CBC (Last 3 Results):   Recent Labs   Lab 06/24/20  0436   WBC 11.86   RBC 4.91   HGB 14.7   HCT 46.1      MCV 94   MCH 29.9   MCHC 31.9*       Significant Diagnostics:  I have reviewed all pertinent imaging results/findings within the past 24 hours.    Assessment/Plan:     * Colostomy in place  - ERAS pathway  - Repeat CRP this morning  - low residue diet  - Dressing changed this morning on rounds and repacked  - Encourage ambulation, OOBTC  - PT/OT   - Lovenox    Dispo: Home today if CRP downtrends      Severe obesity (BMI >= 40)  Body mass index is 32.36 kg/m². Morbid obesity complicates all aspects of disease management from diagnostic modalities to treatment. Weight loss encouraged and health benefits explained to patient.          Stephanie Willis MD  Colorectal Surgery  [facility]

## 2020-06-25 NOTE — PLAN OF CARE
Patient discharged to home with follow up appointment set up. Discharge instructions verbally given and hard copy provided to patient and spouse. Prescriptions delivered bedside. Removed 18g RT hand and 18g LT hand IV with both cath tips in place. Patient tolerated both IV removals well with bleeding controlled. Patient remains free of falls with no acute pain or distress noted. Patient left floor via wheelchair with transport.    Provided pt with gauze and tape for incision. Educated pt ok to shower per Dr. Wilils but no bathing or submerging in water until the ok at postop appointment.

## 2020-06-25 NOTE — PLAN OF CARE
POC reviewed with patient, states understanding. AOx4. VS WDL. Low fiber/residue diet, tolerated well. No complaints of nausea or pain. RLQ packed incision, outer dressing changed once this shift. Voids per urinal, ambulates with stand by assistance. X 1 BM this shift. Will continue to manage POC.

## 2020-06-25 NOTE — DISCHARGE SUMMARY
[facility]  Colorectal Surgery  Discharge Summary      Patient Name: Moe Hinojosa  MRN: [id number]  Admission Date: 6/23/2020  Hospital Length of Stay: 2 days  Discharge Date and Time:  06/25/2020 9:01 AM  Attending Physician: Katie Spear MD   Discharging Provider: Stephanie Willis MD  Primary Care Provider: Tawanda Urrutia MD     HPI:  Patient is a 50 y.o. male presents for possible reversal of his colostomy.  Initially presented with 3rd episode of diverticulitis and 6x7cm abscess (IR drain placed 10/25/2019).  This drain was later removed, and then (per notes) patient required surgery.  Underwent ex-lap, sigmoid resection, colostomy (11/13/2019; Dr Fulton) for sigmoid diverticulitis.  Final pathology benign.  He states that his recovery from surgery was fairly straightforward.  No ICU stay or organ failure.  No significant wound issues.  Doing well with his colostomy.  He feels that he is back to his baseline after surgery. He is back at work.  No other prior abdominal surgery. History of prior inguinal hernia repair in 2007.  No family history of colon or rectal cancer.  Denies any liquid of solid or liquid stool prior to his surgery.  Colonoscopy with me in March showed small adenomatous polyp of the transverse colon.  CT scan March 5th with no residual inflammation or abscess.    Procedure(s) (LRB):  CLOSURE, COLOSTOMY, LAPAROSCOPIC, ERAS low (N/A)     Hospital Course:  MOE HINOJOSA 50 y.o.male underwent: Procedure(s) (LRB):  CLOSURE, COLOSTOMY, LAPAROSCOPIC, ERAS low (N/A). Tolerated procedure well, was transferred to  then to regular floor post op. Please see the dictated operative note for further procedure details.   Hospital course was uncomplicated.   Vitals remained stable, afebrile. Labs were reviewed and electrolytes replaced appropriately.   Physical exam was appropriate for post operative state.   Was able to tolerate a regular diet as it was advanced in an  appropriate surgical manner.   Was able to ambulate and void without difficulty prior to discharge.  Pain and nausea controlled with PRN medications.   Deemed suitable for discharge on 2 Days Post-Op            Significant Diagnostic Studies: Labs:   BMP:   Recent Labs   Lab 06/24/20  0436   *      K 4.6      CO2 24   BUN 10   CREATININE 1.0   CALCIUM 8.5*   MG 2.0   , CBC   Recent Labs   Lab 06/24/20  0436   WBC 11.86   HGB 14.7   HCT 46.1       and CRP: 23    Pending Diagnostic Studies:     Procedure Component Value Units Date/Time    Specimen to Pathology, Surgery Gastrointestinal tract [[id number]] Collected: 06/23/20 1714    Order Status: Sent Lab Status: In process Updated: 06/24/20 1036        Final Active Diagnoses:    Diagnosis Date Noted POA    PRINCIPAL PROBLEM:  Colostomy in place [Z93.3] 06/23/2020 Not Applicable    Severe obesity (BMI >= 40) [E66.01] 06/24/2020 Yes      Problems Resolved During this Admission:      Discharged Condition: good    Disposition: Home or Self Care    Follow Up:  Follow-up Information     Katie Spear MD. Schedule an appointment as soon as possible for a visit in 2 weeks.    Specialty: Colon and Rectal Surgery  Why: Post-operative follow up  Contact information:  [address] [place] [address] [id number] [phone]                 Patient Instructions:      Diet Adult Regular     Lifting restrictions   Order Comments: No lifting more than 10 lbs for 6 weeks.     No driving until:   Order Comments: No driving while still taking pain medications daily.     Notify your health care provider if you experience any of the following:  increased confusion or weakness     Notify your health care provider if you experience any of the following:  persistent dizziness, light-headedness, or visual disturbances     Notify your health care provider if you experience any of the following:  worsening rash     Notify your health care provider if you experience  any of the following:  severe persistent headache     Notify your health care provider if you experience any of the following:  difficulty breathing or increased cough     Notify your health care provider if you experience any of the following:  redness, tenderness, or signs of infection (pain, swelling, redness, odor or green/yellow discharge around incision site)     Notify your health care provider if you experience any of the following:  severe uncontrolled pain     Notify your health care provider if you experience any of the following:  persistent nausea and vomiting or diarrhea     Notify your health care provider if you experience any of the following:  temperature >100.4     Change dressing (specify)   Order Comments: Cover previous ostomy site with gauze to keep clean. Change dressing two times a day or when gauze is saturated.     Activity as tolerated     Medications:  Reconciled Home Medications:      Medication List      START taking these medications    oxyCODONE 5 MG immediate release tablet  Commonly known as: ROXICODONE  Take 1 tablet (5 mg total) by mouth every 4 (four) hours as needed for Pain.        CONTINUE taking these medications    gabapentin 400 MG capsule  Commonly known as: NEURONTIN  Take 400 mg by mouth.        STOP taking these medications    metroNIDAZOLE 500 MG tablet  Commonly known as: FLAGYL     neomycin 500 mg Tab  Commonly known as: MYCIFRADIN     polyethylene glycol 17 gram/dose powder  Commonly known as: GLYCOLAX            Stephanie Willis MD  Colorectal Surgery  [facility]

## 2020-06-25 NOTE — HPI
Patient is a 50 y.o. male presents for possible reversal of his colostomy.  Initially presented with 3rd episode of diverticulitis and 6x7cm abscess (IR drain placed 10/25/2019).  This drain was later removed, and then (per notes) patient required surgery.  Underwent ex-lap, sigmoid resection, colostomy (11/13/2019; Dr Fulton) for sigmoid diverticulitis.  Final pathology benign.  He states that his recovery from surgery was fairly straightforward.  No ICU stay or organ failure.  No significant wound issues.  Doing well with his colostomy.  He feels that he is back to his baseline after surgery. He is back at work.  No other prior abdominal surgery. History of prior inguinal hernia repair in 2007.  No family history of colon or rectal cancer.  Denies any liquid of solid or liquid stool prior to his surgery.  Colonoscopy with me in March showed small adenomatous polyp of the transverse colon.  CT scan March 5th with no residual inflammation or abscess.

## 2020-06-25 NOTE — ASSESSMENT & PLAN NOTE
- ERAS pathway  - Repeat CRP this morning  - low residue diet  - Dressing changed this morning on rounds and repacked  - Encourage ambulation, OOBTC  - PT/OT   - Lovenox    Dispo: Home today if CRP downtrends

## 2020-06-29 LAB
FINAL PATHOLOGIC DIAGNOSIS: NORMAL
GROSS: NORMAL

## 2020-06-30 NOTE — PHYSICIAN QUERY
"PT Name: Moe Hinojosa  MR #: [id number]     Physician Query Form - Documentation Clarification      CDS Kaye Abraham RN, BSN        Contact Information:    [email or web address]           This form is a permanent document in the medical record.     Query Date: June 30, 2020    By submitting this query, we are merely seeking further clarification of documentation. Please utilize your independent clinical judgment when addressing the question(s) below.    The Medical record reflects the following:    Supporting Clinical Findings Location in Medical Record    50 y.o. male presents for possible reversal of his colostomy       6/23 H&P    Severe obesity (BMI >= 40)  Body mass index is 32.36 kg/m²   Morbid obesity complicates all aspects of disease management from diagnostic modalities to treatment. Weight loss encouraged and health benefits explained to patient.    Appearance: He is obese.         Height           5' 11"       Weight         105.2 kg (232 lbs)        BMI               32.4     6/24 PN filed 10:39am       6/25 PN filed   7:46am      6/23 Anthropometrics                                                                             Doctor, Please specify diagnosis or diagnoses associated with above clinical findings.    Please clarify the obesity class/BMI associated with the clinical findings as there is conflicting documentation ... Thank you     TWO PART QUERY       PART 1:   ___x__   Obese                       _____   Morbid Obesity                      _____   Other, please specify  ___________        PART 2:   _____   BMI 32.4                    _____   BMI >= 40                    _____   Other, please specify ____________      _____   Other classification (please specify_ ______________________                                                                                                                 [  ] Clinically Undetermined               "

## 2020-07-10 NOTE — PROGRESS NOTES
"CRS Office Visit Follow-up    SUBJECTIVE:     History of Present Illness:  Patient is a 50 y.o. male who presents following lap colostomy reversal on 6/23/2020. Their post-operative course was uncomplicated. There are no new complaints today.    Final pathology:  1. COLOSTOMY, EXCISION:   Portion of colon consistent with colostomy   2. RECTUM AND SIGMOID COLON, RESECTION:   Diverticuli   Negative for dysplasia or malignancy     Review of Systems:  Review of Systems   All other systems reviewed and are negative.      OBJECTIVE:     Vital Signs (Most Recent)  /80 (BP Location: Right arm, Patient Position: Sitting, BP Method: Large (Automatic))   Pulse 66   Ht 5' 11" (1.803 m)   Wt 108.1 kg (238 lb 5.1 oz)   BMI 33.24 kg/m²     Physical Exam:  General: White male in no distress   Neuro: Alert and oriented x 4.  Moves all extremities.     HEENT: No icterus.  Trachea midline  Respiratory: Respirations are even and unlabored  Cardiac: Regular rate  Abdomen: Lap port sites well-healed, colostomy site with small area of healing granulation tissue, no hernias  Extremities: Warm dry and intact  Skin: No rashes      ASSESSMENT/PLAN:     51yo M s/p lap colostomy reversal 6/23/2020, doing well  - RTC prn    Katie Spear MD  Staff Surgeon, Colon and Rectal Surgery  [facility]      "

## 2020-07-13 ENCOUNTER — OFFICE VISIT (OUTPATIENT)
Dept: SURGERY | Facility: CLINIC | Age: 50
End: 2020-07-13
Attending: COLON & RECTAL SURGERY
Payer: COMMERCIAL

## 2020-07-13 VITALS
BODY MASS INDEX: 33.36 KG/M2 | DIASTOLIC BLOOD PRESSURE: 80 MMHG | HEART RATE: 66 BPM | WEIGHT: 238.31 LBS | SYSTOLIC BLOOD PRESSURE: 134 MMHG | HEIGHT: 71 IN

## 2020-07-13 DIAGNOSIS — Z98.890 POST-OPERATIVE STATE: ICD-10-CM

## 2020-07-13 DIAGNOSIS — Z93.3 COLOSTOMY IN PLACE: Primary | ICD-10-CM

## 2020-07-13 PROCEDURE — 99999 PR PBB SHADOW E&M-EST. PATIENT-LVL III: CPT | Mod: PBBFAC,,, | Performed by: COLON & RECTAL SURGERY

## 2020-07-13 PROCEDURE — 99024 PR POST-OP FOLLOW-UP VISIT: ICD-10-PCS | Mod: S$GLB,,, | Performed by: COLON & RECTAL SURGERY

## 2020-07-13 PROCEDURE — 99024 POSTOP FOLLOW-UP VISIT: CPT | Mod: S$GLB,,, | Performed by: COLON & RECTAL SURGERY

## 2020-07-13 PROCEDURE — 99999 PR PBB SHADOW E&M-EST. PATIENT-LVL III: ICD-10-PCS | Mod: PBBFAC,,, | Performed by: COLON & RECTAL SURGERY

## 2020-07-14 ENCOUNTER — TELEPHONE (OUTPATIENT)
Dept: SURGERY | Facility: CLINIC | Age: 50
End: 2020-07-14

## (undated) DEVICE — LUBRICANT SURGILUBE 2 OZ

## (undated) DEVICE — MARKER SKIN STND TIP BLUE BARR

## (undated) DEVICE — GAUZE SPONGE 4'X4 12 PLY

## (undated) DEVICE — Device

## (undated) DEVICE — STAPLER ENDO GIA 60MM MED THCK

## (undated) DEVICE — STAPLER ECHELON PWR CIR 29MM

## (undated) DEVICE — SEE MEDLINE ITEM 157144

## (undated) DEVICE — SOL NS 1000CC

## (undated) DEVICE — TUBING HF INSUFFLATION W/ FLTR

## (undated) DEVICE — SEE MEDLINE ITEM 152487

## (undated) DEVICE — IRRIGATOR ENDOSCOPY DISP.

## (undated) DEVICE — ELECTRODE REM PLYHSV RETURN 9

## (undated) DEVICE — NDL BOX COUNTER

## (undated) DEVICE — ADHESIVE DERMABOND ADVANCED

## (undated) DEVICE — COVER LIGHT HANDLE 80/CA

## (undated) DEVICE — SEE MEDLINE ITEM 146347

## (undated) DEVICE — NDL 22GA X1 1/2 REG BEVEL

## (undated) DEVICE — SUT CTD VICRYL VIL BR CR/SH

## (undated) DEVICE — SYR ONLY LUER LOCK 20CC

## (undated) DEVICE — POUCH SENSURA MIO 3/8X2 1/8IN

## (undated) DEVICE — SEE MEDLINE ITEM 146417

## (undated) DEVICE — KIT ANTIFOG

## (undated) DEVICE — DRAPE ABDOMINAL TIBURON 14X11

## (undated) DEVICE — SCISSOR 5MMX35CM DIRECT DRIVE

## (undated) DEVICE — KIT GELPOINT ADV ACC PLATFORM

## (undated) DEVICE — SUT COATED VICRYL 4/0 27IN

## (undated) DEVICE — DRESSING LEUKOPLAST FLEX 1X3IN

## (undated) DEVICE — LEGGINGS 48X31 INCH

## (undated) DEVICE — SEE MEDLINE ITEM 156902

## (undated) DEVICE — SUT CTD VICRYL 3-0 VIL BR

## (undated) DEVICE — SUT 3/0 27IN PDS II VIO MO

## (undated) DEVICE — SEE MEDLINE ITEM 157117

## (undated) DEVICE — SEE MEDLINE ITEM 154981

## (undated) DEVICE — TROCAR ENDOPATH XCEL 5X75MM

## (undated) DEVICE — SEALER LIGASURE LAP 37CM 5MM

## (undated) DEVICE — TROCAR ENDOPATH XCEL 5MM 7.5CM

## (undated) DEVICE — TROCAR ENDOPATH XCEL 12MM 10CM

## (undated) DEVICE — TRAY FOLEY 16FR INFECTION CONT

## (undated) DEVICE — SEE MEDLINE ITEM 152622

## (undated) DEVICE — STAPLER HANDLE XL 26 CM

## (undated) DEVICE — CUTTER PROXIMATE BLUE 75MM

## (undated) DEVICE — SUT 0 VICRYL / UR6 (J603)

## (undated) DEVICE — DRESSING TRANS 4X4 TEGADERM